# Patient Record
Sex: MALE | Race: WHITE | Employment: OTHER | URBAN - METROPOLITAN AREA
[De-identification: names, ages, dates, MRNs, and addresses within clinical notes are randomized per-mention and may not be internally consistent; named-entity substitution may affect disease eponyms.]

---

## 2022-10-19 ENCOUNTER — OFFICE VISIT (OUTPATIENT)
Dept: INTERNAL MEDICINE | Facility: CLINIC | Age: 77
End: 2022-10-19
Payer: MEDICARE

## 2022-10-19 VITALS
OXYGEN SATURATION: 97 % | WEIGHT: 197.75 LBS | TEMPERATURE: 98 F | BODY MASS INDEX: 27.69 KG/M2 | HEART RATE: 72 BPM | HEIGHT: 71 IN | SYSTOLIC BLOOD PRESSURE: 130 MMHG | DIASTOLIC BLOOD PRESSURE: 64 MMHG

## 2022-10-19 DIAGNOSIS — M17.12 PRIMARY OSTEOARTHRITIS OF LEFT KNEE: Primary | ICD-10-CM

## 2022-10-19 DIAGNOSIS — Z01.810 PREOP CARDIOVASCULAR EXAM: ICD-10-CM

## 2022-10-19 DIAGNOSIS — I10 HYPERTENSION, UNSPECIFIED TYPE: ICD-10-CM

## 2022-10-19 PROBLEM — C61 PROSTATE CANCER: Status: ACTIVE | Noted: 2017-11-16

## 2022-10-19 PROCEDURE — 1160F RVW MEDS BY RX/DR IN RCRD: CPT | Mod: CPTII,S$GLB,, | Performed by: FAMILY MEDICINE

## 2022-10-19 PROCEDURE — 1101F PR PT FALLS ASSESS DOC 0-1 FALLS W/OUT INJ PAST YR: ICD-10-PCS | Mod: CPTII,S$GLB,, | Performed by: FAMILY MEDICINE

## 2022-10-19 PROCEDURE — 1159F PR MEDICATION LIST DOCUMENTED IN MEDICAL RECORD: ICD-10-PCS | Mod: CPTII,S$GLB,, | Performed by: FAMILY MEDICINE

## 2022-10-19 PROCEDURE — 1160F PR REVIEW ALL MEDS BY PRESCRIBER/CLIN PHARMACIST DOCUMENTED: ICD-10-PCS | Mod: CPTII,S$GLB,, | Performed by: FAMILY MEDICINE

## 2022-10-19 PROCEDURE — 3075F PR MOST RECENT SYSTOLIC BLOOD PRESS GE 130-139MM HG: ICD-10-PCS | Mod: CPTII,S$GLB,, | Performed by: FAMILY MEDICINE

## 2022-10-19 PROCEDURE — 99204 PR OFFICE/OUTPT VISIT, NEW, LEVL IV, 45-59 MIN: ICD-10-PCS | Mod: S$GLB,,, | Performed by: FAMILY MEDICINE

## 2022-10-19 PROCEDURE — 1159F MED LIST DOCD IN RCRD: CPT | Mod: CPTII,S$GLB,, | Performed by: FAMILY MEDICINE

## 2022-10-19 PROCEDURE — 1101F PT FALLS ASSESS-DOCD LE1/YR: CPT | Mod: CPTII,S$GLB,, | Performed by: FAMILY MEDICINE

## 2022-10-19 PROCEDURE — 1126F PR PAIN SEVERITY QUANTIFIED, NO PAIN PRESENT: ICD-10-PCS | Mod: CPTII,S$GLB,, | Performed by: FAMILY MEDICINE

## 2022-10-19 PROCEDURE — 3078F PR MOST RECENT DIASTOLIC BLOOD PRESSURE < 80 MM HG: ICD-10-PCS | Mod: CPTII,S$GLB,, | Performed by: FAMILY MEDICINE

## 2022-10-19 PROCEDURE — 99999 PR PBB SHADOW E&M-NEW PATIENT-LVL III: CPT | Mod: PBBFAC,,, | Performed by: FAMILY MEDICINE

## 2022-10-19 PROCEDURE — 1126F AMNT PAIN NOTED NONE PRSNT: CPT | Mod: CPTII,S$GLB,, | Performed by: FAMILY MEDICINE

## 2022-10-19 PROCEDURE — 3075F SYST BP GE 130 - 139MM HG: CPT | Mod: CPTII,S$GLB,, | Performed by: FAMILY MEDICINE

## 2022-10-19 PROCEDURE — 3288F FALL RISK ASSESSMENT DOCD: CPT | Mod: CPTII,S$GLB,, | Performed by: FAMILY MEDICINE

## 2022-10-19 PROCEDURE — 99204 OFFICE O/P NEW MOD 45 MIN: CPT | Mod: S$GLB,,, | Performed by: FAMILY MEDICINE

## 2022-10-19 PROCEDURE — 3288F PR FALLS RISK ASSESSMENT DOCUMENTED: ICD-10-PCS | Mod: CPTII,S$GLB,, | Performed by: FAMILY MEDICINE

## 2022-10-19 PROCEDURE — 3078F DIAST BP <80 MM HG: CPT | Mod: CPTII,S$GLB,, | Performed by: FAMILY MEDICINE

## 2022-10-19 PROCEDURE — 99999 PR PBB SHADOW E&M-NEW PATIENT-LVL III: ICD-10-PCS | Mod: PBBFAC,,, | Performed by: FAMILY MEDICINE

## 2022-10-19 RX ORDER — MULTIVITAMIN
1 TABLET ORAL DAILY
COMMUNITY

## 2022-10-19 RX ORDER — OMEPRAZOLE 40 MG/1
1 CAPSULE, DELAYED RELEASE ORAL DAILY
COMMUNITY
Start: 2022-07-26 | End: 2023-08-08

## 2022-10-19 RX ORDER — LOSARTAN POTASSIUM AND HYDROCHLOROTHIAZIDE 12.5; 1 MG/1; MG/1
1 TABLET ORAL DAILY
COMMUNITY
Start: 2022-07-26 | End: 2023-05-11 | Stop reason: SDUPTHER

## 2022-10-19 NOTE — PROGRESS NOTES
"Subjective:      Patient ID: Bhargav Ferguson is a 77 y.o. male.    Chief Complaint: Pre-op Exam and Establish Care    HPI 77 y.o.   male patient with a PMHx of arthritis presents to clinic to establish care. This is the patient's first encounter with Dr. PERKINS.    Today he reports ***. Patient otherwise without complaints. Denies SOB, chest pain, and bowel changes.        Past Medical History:   Diagnosis Date    Arthritis      Family History   Problem Relation Age of Onset    Cancer Mother     Diabetes Father      Past Surgical History:   Procedure Laterality Date    APPENDECTOMY      HEEL SPUR SURGERY Right      Social History     Tobacco Use    Smoking status: Never    Smokeless tobacco: Never       BP (!) 150/80   Pulse 72   Temp 98 °F (36.7 °C)   Ht 5' 11" (1.803 m)   Wt 89.7 kg (197 lb 12 oz)   SpO2 97%   BMI 27.58 kg/m²     Review of Systems   Constitutional:  Negative for activity change, appetite change, chills, diaphoresis, fatigue, fever and unexpected weight change.   HENT:  Negative for hearing loss and rhinorrhea.    Eyes:  Negative for discharge and visual disturbance.   Respiratory:  Negative for cough, chest tightness, shortness of breath and wheezing.    Cardiovascular:  Negative for chest pain, palpitations and leg swelling.   Gastrointestinal:  Negative for abdominal distention, abdominal pain, blood in stool, constipation, diarrhea and vomiting.   Endocrine: Negative for polydipsia and polyuria.   Genitourinary:  Negative for difficulty urinating, dysuria, frequency, hematuria and urgency.   Musculoskeletal:  Negative for arthralgias, back pain, joint swelling and neck pain.   Skin:  Negative for color change and rash.   Neurological:  Negative for weakness and headaches.   Hematological:  Negative for adenopathy.   Psychiatric/Behavioral:  Negative for confusion, decreased concentration and dysphoric mood.      Objective:     Physical Exam  Vitals and nursing note reviewed.   Constitutional:  "      General: He is not in acute distress.  HENT:      Right Ear: External ear normal.      Left Ear: External ear normal.      Nose: Nose normal.   Eyes:      Conjunctiva/sclera: Conjunctivae normal.      Pupils: Pupils are equal, round, and reactive to light.   Neck:      Vascular: No carotid bruit.   Cardiovascular:      Rate and Rhythm: Normal rate and regular rhythm.      Heart sounds: Normal heart sounds.   Pulmonary:      Effort: Pulmonary effort is normal. No respiratory distress.      Breath sounds: Normal breath sounds. No wheezing or rales.   Abdominal:      General: Bowel sounds are normal. There is no distension.      Palpations: Abdomen is soft.      Tenderness: There is no abdominal tenderness. There is no guarding.   Musculoskeletal:      Cervical back: Normal range of motion and neck supple.      Right lower leg: No edema.      Left lower leg: No edema.   Skin:     General: Skin is warm and dry.      Findings: No rash.   Neurological:      Mental Status: He is alert and oriented to person, place, and time.   Psychiatric:         Behavior: Behavior normal.         Thought Content: Thought content normal.         Judgment: Judgment normal.       No results found for: WBC, HGB, HCT, PLT, CHOL, TRIG, HDL, LDLDIRECT, ALT, AST, NA, K, CL, CREATININE, BUN, CO2, TSH, PSA, INR, GLUF, HGBA1C, MICROALBUR    Assessment:     No diagnosis found.     Plan:     There are no diagnoses linked to this encounter.    Vitals reviewed. BP elevated at 150/80    As an establishing patient, he will need baseline labs. Reviewed medical, social and family history. Reviewed medication list.       Questions and concerns addressed.          Documentation entered by Denny Hamilton, acting as scribe for Dr. Juan Mckee. 10/19/2022 9:20 AM.

## 2022-10-19 NOTE — PROGRESS NOTES
"Outpatient Psychiatry Follow up(MD/NP)    4/13/2017    Tiffany Hendrix, a 51 y.o. female, presenting for initial evaluation visit. Met with patient.    Reason for Encounter: follow up*. Patient complains of   Chief Complaint   Patient presents with    Mood Disorder    Anxiety   .    History of Present Illness:     Patient was seen and examined. Her chart was reviewed.     She has been compliant with treatment. She denied ay side effects.     Her knee replacement surgery is to be scheduled for 6/2017. The bone marrow work up was negative.     Family and marriage are stable. She is not working at this time, secondary to chronic back pain issues.     She feels well and requests a decrease in wellbutrin to q day, "I have been feeling good and would like to not be on so much meds."     Denied Symptoms of Depression: no diminished mood or loss of interest/anhedonia; no irritability, diminished energy, change in sleep, change in appetite, diminished concentration or cognition or indecisiveness, PMA/R, excessive guilt or hopelessness or worthlessness, or suicidal ideations; no anxious features; no episodes that lasted more than 2 weeks or more at a time- episodes last a few days at a time    Denied issues with Sleep: no issues with initiation, maintenance, early morning awakening with inability to return to sleep, or hypersomnolence     Denied Suicidal/Homicidal ideations: no active/passive ideations, organized plans, or future intentions    Denied Symptoms of psychosis: no hallucinations, delusions, disorganized thinking, disorganized behavior or abnormal motor behavior, or negative symptoms     Denied past or current Symptoms of aron or hypomania: no elevated, expansive, or irritable mood with increased energy or activity; with no inflated self-esteem or grandiosity, decreased need for sleep, increased rate of speech, FOI or racing thoughts, distractibility, increased goal directed activity or PMA, or " "Subjective:      Patient ID: Bhargav eFrguson is a 77 y.o. male who presents to the office today for a preoperative consultation at the request of surgeon Dr. Phipps who plans on performing total knee replacement (left) on November 1. This consultation is requested for the specific conditions prompting preoperative evaluation (i.e. because of potential affect on operative risk). Planned anesthesia: local. The patient has the following known anesthesia issues: none. Patients bleeding risk: none. Patient does not have objections to receiving blood products if needed.    The following portions of the patient's history were reviewed and updated as appropriate:    Chief Complaint: Pre-op Exam and Establish Care    HPI   Patient is unknown to Dr. Prakash. This is the patient's first encounter with Dr. PERKINS. Patient reports he seen his cardiologist, Dr. Rodney on 9/30/22. He states Dr. Rodney did perform a pre-op exam and ordered an EKG. Patient reports Dr. Phipps has ordered all required labs. Patient otherwise without complaints. Denies SOB, chest pain, and bowel changes.      Past Medical History:   Diagnosis Date    Arthritis     Elevated PSA     questionable hx of prostate CA    HTN (hypertension)      Family History   Problem Relation Age of Onset    Cancer Mother     Diabetes Father      Past Surgical History:   Procedure Laterality Date    APPENDECTOMY      HEEL SPUR SURGERY Right      Social History     Tobacco Use    Smoking status: Never    Smokeless tobacco: Never   Substance Use Topics    Drug use: Never       /64   Pulse 72   Temp 98 °F (36.7 °C)   Ht 5' 11" (1.803 m)   Wt 89.7 kg (197 lb 12 oz)   SpO2 97%   BMI 27.58 kg/m²     Review of Systems   Constitutional:  Positive for activity change. Negative for appetite change, chills, diaphoresis, fatigue, fever and unexpected weight change.   HENT:  Negative for hearing loss and rhinorrhea.    Eyes:  Negative for discharge and visual disturbance. "   Respiratory:  Negative for cough, chest tightness, shortness of breath and wheezing.    Cardiovascular:  Negative for chest pain, palpitations and leg swelling.   Gastrointestinal:  Negative for abdominal distention, abdominal pain, blood in stool, constipation, diarrhea and vomiting.   Endocrine: Negative for polydipsia and polyuria.   Genitourinary:  Negative for difficulty urinating, dysuria, frequency, hematuria and urgency.   Musculoskeletal:  Positive for arthralgias. Negative for back pain, joint swelling and neck pain.   Skin:  Negative for color change and rash.   Neurological:  Negative for weakness and headaches.   Hematological:  Negative for adenopathy.   Psychiatric/Behavioral:  Negative for confusion, decreased concentration and dysphoric mood.      Objective:     Physical Exam  Vitals and nursing note reviewed.   Constitutional:       General: He is not in acute distress.  HENT:      Right Ear: External ear normal.      Left Ear: External ear normal.      Nose: Nose normal.   Eyes:      Conjunctiva/sclera: Conjunctivae normal.      Pupils: Pupils are equal, round, and reactive to light.   Neck:      Vascular: No carotid bruit.   Cardiovascular:      Rate and Rhythm: Normal rate and regular rhythm.      Heart sounds: Normal heart sounds.   Pulmonary:      Effort: Pulmonary effort is normal. No respiratory distress.      Breath sounds: Normal breath sounds. No wheezing or rales.   Abdominal:      General: Bowel sounds are normal. There is no distension.      Palpations: Abdomen is soft.      Tenderness: There is no abdominal tenderness. There is no guarding.   Musculoskeletal:      Cervical back: Normal range of motion and neck supple.      Right lower leg: No edema.      Left lower leg: No edema.   Skin:     General: Skin is warm and dry.      Findings: No rash.   Neurological:      Mental Status: He is alert and oriented to person, place, and time.   Psychiatric:         Behavior: Behavior normal.   "risky/disinhibited behavior    She would like to continue simplifying/decreasing her medications    A comprehensive ROS was negative aside from chronic knee pain    Spoke with her  who reports that she is doing well and corroborates the above and following history and plan.   .  Current Evaluation:     Nutritional Screening: Considering the patient's height and weight, medications, medical history and preferences, should a referral be made to the dietitian? no    Constitutional  Vitals:  Most recent vital signs, dated less than 90 days prior to this appointment, were reviewed.    Vitals:    04/13/17 1236   BP: 119/72   Pulse: 73   Resp: 16   Weight: 113.3 kg (249 lb 14.3 oz)   Height: 5' 7" (1.702 m)     Body mass index is 39.14 kg/(m^2).       General:  unremarkable, age appropriate     Musculoskeletal  Muscle Strength/Tone:  not examined, no dyskinesia, no dystonia, no tremor, no tic   Gait & Station:  non-ataxic     Psychiatric  Speech:  no latency; no press   Mood & Affect:  euthymic "good"  congruent and appropriate   Thought Process:  normal and logical   Associations:  intact   Thought Content:  normal, no suicidality, no homicidality, delusions, or paranoia   Insight:  intact   Judgement: behavior is adequate to circumstances   Orientation:  grossly intact   Memory: intact for content of interview   Language: grossly intact, able to name, able to repeat   Attention Span & Concentration:  able to focus   Fund of Knowledge:  intact and appropriate to age and level of education       Relevant Elements of Neurological Exam: normal gait    Laboratory Data  No visits with results within 1 Month(s) from this visit.  Latest known visit with results is:    Lab Visit on 02/26/2013   Component Date Value Ref Range Status    TSH 02/26/2013 0.071* 0.4 - 4.0 uIU/ml Final    Free T4 02/26/2013 0.74  0.71 - 1.51 ng/dl Final    T3, Free 02/26/2013 4.6* 2.3 - 4.2 pg/mL Final    T3, Total 02/26/2013 217.83* 60 - 180 "        Thought Content: Thought content normal.         Judgment: Judgment normal.       No results found for: WBC, HGB, HCT, PLT, CHOL, TRIG, HDL, LDLDIRECT, ALT, AST, NA, K, CL, CREATININE, BUN, CO2, TSH, PSA, INR, GLUF, HGBA1C, MICROALBUR    Assessment:     1. Primary osteoarthritis of left knee    2. Preop cardiovascular exam    3. Hypertension, unspecified type         Plan:     Primary osteoarthritis of left knee    Preop cardiovascular exam    Hypertension, unspecified type    Vital reviewed. BP elevated at 150/80. Recheck 130/62    Outside labs reviewed and discussed.    I reviewed the patient's past medical, surgical, social and family history and with  physical exam findings and the proposed surgery and I make the following recommendations:     From a cardiac standpoint the patient is low risk for surgery with a low risk surgery.   Pt was evaluated by his cardiology 9/30/22.    From a pulmonary standpoint the patient presents as a good candidate as well. The patient has no history of lung disease or pulmonary symptoms. Good pulmonary toilet, incentive spirometry, early ambulation are all recommended to improve the pulmonary outcome. No further pulmonary workup as noted prior to surgery.     DVT prophylaxis should be per standard. Venous compression devices are recommended. Early ambulation. Patient has been educated on signs and symptoms of both DVT and pulmonary embolus and instructed on what to do if there are symptoms postop.     The patient has been instructed to take blood pressure medication the morning of surgery with a sip of water. Avoid any vitamins and supplements between now and surgery.  Hold all ASA/NSAID products 7 days prior to surgery.    Reviewed preop labs/MRSA/UA//all are within acceptable range.  Pt overall, low risk candidate to proceed with surgery.  Will fax clearance note to Dr. Dereck Phipps     Juan Ben Prakash MD        Documentation entered by Denny Hamilton,  ng/dl Final         Medications  Outpatient Encounter Prescriptions as of 4/13/2017   Medication Sig Dispense Refill    aspirin (ECOTRIN) 81 MG EC tablet Take 81 mg by mouth once daily.      buPROPion (WELLBUTRIN SR) 150 MG TBSR 12 hr tablet Take 1 tablet (150 mg total) by mouth 2 (two) times daily. 180 tablet 4    calcium carbonate-vitamin D3 (CALCIUM 600 + D,3,) 600 mg calcium- 200 unit Cap Take by mouth.      duloxetine (CYMBALTA) 60 MG capsule Take 1 capsule (60 mg total) by mouth once daily. With 30 mg capsule for a total of 90 mg per day (Patient taking differently: Take 60 mg by mouth once daily. TAKE ONE CAPSULE BY MOUTH DAILY.) 90 capsule 0    ESTRADIOL (VIVELLE-DOT TD) Place onto the skin.      lamotrigine (LAMICTAL) 100 MG tablet Take 1 tablet (100 mg total) by mouth once daily. 90 tablet 0    levothyroxine (SYNTHROID) 112 MCG tablet TAKE 1 TABLET ONCE A DAY ORALLY 90 DAYS  1    metoprolol succinate (TOPROL-XL) 25 MG 24 hr tablet Take 25 mg by mouth once daily.      MULTIVIT &MINERALS/FERROUS FUM (MULTI VITAMIN ORAL) Take by mouth.      pantoprazole (PROTONIX) 40 MG tablet Take 40 mg by mouth once daily.      rosuvastatin (CRESTOR) 10 MG tablet Take 10 mg by mouth every evening.      [DISCONTINUED] diazePAM (VALIUM) 2 MG tablet Take 1-2 tabs (2-4 mg) as needed for anxiety for MRI procedure 4 tablet 0     No facility-administered encounter medications on file as of 4/13/2017.            Assessment - Diagnosis - Goals:     Unspecified Depressive Disorder  Unspecified Anxiety Disorder    Treatment Plan/Recommendations:       Medications:  Trial of decreased Bupropion  mg po q DAILY for depression  Cymbalta to 60 mg po q AM for depression/chronic pain  Lamictal 100 mg po q day for adjunctive depression/chronic pain    Discussed diagnosis, risks and benefits of proposed treatment vs alternative treatments vs no treatment, and potential side effects of these treatments.  The patient expresses  acting as scribe for Dr. Juan Mckee. 10/19/2022 9:35 AM.   understanding of the above and displays the capacity to agree with this treatment given said understanding.  Patient also agrees that, currently, the benefits outweigh the risks and would like to pursue treatment at this time.    We discussed risk of relapse with lower dosages- she was counseled on s/s of relapse. She and her  will be on guard fo rthem and resume previous dosages and call the clinic if symptoms return.     Therapy:   Not indicated at this time; will refer if/when needed    Return to Clinic: 3 months, sooner if needed    Jey Og MD  Psychiatry

## 2022-10-20 ENCOUNTER — TELEPHONE (OUTPATIENT)
Dept: CARDIOLOGY | Facility: CLINIC | Age: 77
End: 2022-10-20
Payer: MEDICARE

## 2022-10-20 DIAGNOSIS — I34.0 MITRAL VALVE INSUFFICIENCY, UNSPECIFIED ETIOLOGY: Primary | ICD-10-CM

## 2023-03-08 ENCOUNTER — OFFICE VISIT (OUTPATIENT)
Dept: INTERNAL MEDICINE | Facility: CLINIC | Age: 78
End: 2023-03-08
Payer: MEDICARE

## 2023-03-08 ENCOUNTER — LAB VISIT (OUTPATIENT)
Dept: LAB | Facility: HOSPITAL | Age: 78
End: 2023-03-08
Attending: FAMILY MEDICINE
Payer: MEDICARE

## 2023-03-08 VITALS
HEART RATE: 67 BPM | BODY MASS INDEX: 29.03 KG/M2 | WEIGHT: 202.81 LBS | DIASTOLIC BLOOD PRESSURE: 78 MMHG | HEIGHT: 70 IN | SYSTOLIC BLOOD PRESSURE: 138 MMHG | TEMPERATURE: 97 F

## 2023-03-08 DIAGNOSIS — Z12.5 PROSTATE CANCER SCREENING: ICD-10-CM

## 2023-03-08 DIAGNOSIS — E78.5 DYSLIPIDEMIA: ICD-10-CM

## 2023-03-08 DIAGNOSIS — Z00.00 ANNUAL PHYSICAL EXAM: Primary | ICD-10-CM

## 2023-03-08 DIAGNOSIS — I10 HYPERTENSION, UNSPECIFIED TYPE: ICD-10-CM

## 2023-03-08 DIAGNOSIS — R73.09 ABNORMAL GLUCOSE: ICD-10-CM

## 2023-03-08 LAB
ALBUMIN SERPL BCP-MCNC: 4 G/DL (ref 3.5–5.2)
ALP SERPL-CCNC: 74 U/L (ref 55–135)
ALT SERPL W/O P-5'-P-CCNC: 23 U/L (ref 10–44)
ANION GAP SERPL CALC-SCNC: 8 MMOL/L (ref 8–16)
AST SERPL-CCNC: 23 U/L (ref 10–40)
BASOPHILS # BLD AUTO: 0.05 K/UL (ref 0–0.2)
BASOPHILS NFR BLD: 0.7 % (ref 0–1.9)
BILIRUB SERPL-MCNC: 0.7 MG/DL (ref 0.1–1)
BUN SERPL-MCNC: 27 MG/DL (ref 8–23)
CALCIUM SERPL-MCNC: 10 MG/DL (ref 8.7–10.5)
CHLORIDE SERPL-SCNC: 108 MMOL/L (ref 95–110)
CHOLEST SERPL-MCNC: 187 MG/DL (ref 120–199)
CHOLEST/HDLC SERPL: 3.9 {RATIO} (ref 2–5)
CO2 SERPL-SCNC: 27 MMOL/L (ref 23–29)
COMPLEXED PSA SERPL-MCNC: 7.7 NG/ML (ref 0–4)
CREAT SERPL-MCNC: 1.2 MG/DL (ref 0.5–1.4)
DIFFERENTIAL METHOD: ABNORMAL
EOSINOPHIL # BLD AUTO: 0.1 K/UL (ref 0–0.5)
EOSINOPHIL NFR BLD: 1.9 % (ref 0–8)
ERYTHROCYTE [DISTWIDTH] IN BLOOD BY AUTOMATED COUNT: 14.3 % (ref 11.5–14.5)
EST. GFR  (NO RACE VARIABLE): >60 ML/MIN/1.73 M^2
ESTIMATED AVG GLUCOSE: 117 MG/DL (ref 68–131)
GLUCOSE SERPL-MCNC: 94 MG/DL (ref 70–110)
HBA1C MFR BLD: 5.7 % (ref 4–5.6)
HCT VFR BLD AUTO: 42.5 % (ref 40–54)
HDLC SERPL-MCNC: 48 MG/DL (ref 40–75)
HDLC SERPL: 25.7 % (ref 20–50)
HGB BLD-MCNC: 13 G/DL (ref 14–18)
IMM GRANULOCYTES # BLD AUTO: 0.02 K/UL (ref 0–0.04)
IMM GRANULOCYTES NFR BLD AUTO: 0.3 % (ref 0–0.5)
LDLC SERPL CALC-MCNC: 117.6 MG/DL (ref 63–159)
LYMPHOCYTES # BLD AUTO: 2.7 K/UL (ref 1–4.8)
LYMPHOCYTES NFR BLD: 39.9 % (ref 18–48)
MCH RBC QN AUTO: 28.8 PG (ref 27–31)
MCHC RBC AUTO-ENTMCNC: 30.6 G/DL (ref 32–36)
MCV RBC AUTO: 94 FL (ref 82–98)
MONOCYTES # BLD AUTO: 0.6 K/UL (ref 0.3–1)
MONOCYTES NFR BLD: 8.2 % (ref 4–15)
NEUTROPHILS # BLD AUTO: 3.3 K/UL (ref 1.8–7.7)
NEUTROPHILS NFR BLD: 49 % (ref 38–73)
NONHDLC SERPL-MCNC: 139 MG/DL
NRBC BLD-RTO: 0 /100 WBC
PLATELET # BLD AUTO: 244 K/UL (ref 150–450)
PMV BLD AUTO: 8.5 FL (ref 9.2–12.9)
POTASSIUM SERPL-SCNC: 4.7 MMOL/L (ref 3.5–5.1)
PROT SERPL-MCNC: 7.2 G/DL (ref 6–8.4)
RBC # BLD AUTO: 4.51 M/UL (ref 4.6–6.2)
SODIUM SERPL-SCNC: 143 MMOL/L (ref 136–145)
TRIGL SERPL-MCNC: 107 MG/DL (ref 30–150)
TSH SERPL DL<=0.005 MIU/L-ACNC: 2.08 UIU/ML (ref 0.4–4)
WBC # BLD AUTO: 6.7 K/UL (ref 3.9–12.7)

## 2023-03-08 PROCEDURE — G0009 ADMIN PNEUMOCOCCAL VACCINE: HCPCS | Mod: HCNC,S$GLB,, | Performed by: FAMILY MEDICINE

## 2023-03-08 PROCEDURE — 84153 ASSAY OF PSA TOTAL: CPT | Mod: HCNC | Performed by: FAMILY MEDICINE

## 2023-03-08 PROCEDURE — 3288F PR FALLS RISK ASSESSMENT DOCUMENTED: ICD-10-PCS | Mod: HCNC,CPTII,S$GLB, | Performed by: FAMILY MEDICINE

## 2023-03-08 PROCEDURE — 80061 LIPID PANEL: CPT | Mod: HCNC | Performed by: FAMILY MEDICINE

## 2023-03-08 PROCEDURE — 1126F PR PAIN SEVERITY QUANTIFIED, NO PAIN PRESENT: ICD-10-PCS | Mod: HCNC,CPTII,S$GLB, | Performed by: FAMILY MEDICINE

## 2023-03-08 PROCEDURE — 1101F PR PT FALLS ASSESS DOC 0-1 FALLS W/OUT INJ PAST YR: ICD-10-PCS | Mod: HCNC,CPTII,S$GLB, | Performed by: FAMILY MEDICINE

## 2023-03-08 PROCEDURE — 99397 PER PM REEVAL EST PAT 65+ YR: CPT | Mod: HCNC,25,S$GLB, | Performed by: FAMILY MEDICINE

## 2023-03-08 PROCEDURE — 90677 PNEUMOCOCCAL CONJUGATE VACCINE 20-VALENT: ICD-10-PCS | Mod: HCNC,S$GLB,, | Performed by: FAMILY MEDICINE

## 2023-03-08 PROCEDURE — 84443 ASSAY THYROID STIM HORMONE: CPT | Mod: HCNC | Performed by: FAMILY MEDICINE

## 2023-03-08 PROCEDURE — 3078F PR MOST RECENT DIASTOLIC BLOOD PRESSURE < 80 MM HG: ICD-10-PCS | Mod: HCNC,CPTII,S$GLB, | Performed by: FAMILY MEDICINE

## 2023-03-08 PROCEDURE — 3078F DIAST BP <80 MM HG: CPT | Mod: HCNC,CPTII,S$GLB, | Performed by: FAMILY MEDICINE

## 2023-03-08 PROCEDURE — 1126F AMNT PAIN NOTED NONE PRSNT: CPT | Mod: HCNC,CPTII,S$GLB, | Performed by: FAMILY MEDICINE

## 2023-03-08 PROCEDURE — 83036 HEMOGLOBIN GLYCOSYLATED A1C: CPT | Mod: HCNC | Performed by: FAMILY MEDICINE

## 2023-03-08 PROCEDURE — 3288F FALL RISK ASSESSMENT DOCD: CPT | Mod: HCNC,CPTII,S$GLB, | Performed by: FAMILY MEDICINE

## 2023-03-08 PROCEDURE — 1159F MED LIST DOCD IN RCRD: CPT | Mod: HCNC,CPTII,S$GLB, | Performed by: FAMILY MEDICINE

## 2023-03-08 PROCEDURE — 3075F SYST BP GE 130 - 139MM HG: CPT | Mod: HCNC,CPTII,S$GLB, | Performed by: FAMILY MEDICINE

## 2023-03-08 PROCEDURE — 99397 PR PREVENTIVE VISIT,EST,65 & OVER: ICD-10-PCS | Mod: HCNC,25,S$GLB, | Performed by: FAMILY MEDICINE

## 2023-03-08 PROCEDURE — 1160F RVW MEDS BY RX/DR IN RCRD: CPT | Mod: HCNC,CPTII,S$GLB, | Performed by: FAMILY MEDICINE

## 2023-03-08 PROCEDURE — 1160F PR REVIEW ALL MEDS BY PRESCRIBER/CLIN PHARMACIST DOCUMENTED: ICD-10-PCS | Mod: HCNC,CPTII,S$GLB, | Performed by: FAMILY MEDICINE

## 2023-03-08 PROCEDURE — 1159F PR MEDICATION LIST DOCUMENTED IN MEDICAL RECORD: ICD-10-PCS | Mod: HCNC,CPTII,S$GLB, | Performed by: FAMILY MEDICINE

## 2023-03-08 PROCEDURE — 3075F PR MOST RECENT SYSTOLIC BLOOD PRESS GE 130-139MM HG: ICD-10-PCS | Mod: HCNC,CPTII,S$GLB, | Performed by: FAMILY MEDICINE

## 2023-03-08 PROCEDURE — 1101F PT FALLS ASSESS-DOCD LE1/YR: CPT | Mod: HCNC,CPTII,S$GLB, | Performed by: FAMILY MEDICINE

## 2023-03-08 PROCEDURE — 99999 PR PBB SHADOW E&M-EST. PATIENT-LVL III: CPT | Mod: PBBFAC,HCNC,, | Performed by: FAMILY MEDICINE

## 2023-03-08 PROCEDURE — G0009 PNEUMOCOCCAL CONJUGATE VACCINE 20-VALENT: ICD-10-PCS | Mod: HCNC,S$GLB,, | Performed by: FAMILY MEDICINE

## 2023-03-08 PROCEDURE — 80053 COMPREHEN METABOLIC PANEL: CPT | Mod: HCNC | Performed by: FAMILY MEDICINE

## 2023-03-08 PROCEDURE — 99999 PR PBB SHADOW E&M-EST. PATIENT-LVL III: ICD-10-PCS | Mod: PBBFAC,HCNC,, | Performed by: FAMILY MEDICINE

## 2023-03-08 PROCEDURE — 85025 COMPLETE CBC W/AUTO DIFF WBC: CPT | Mod: HCNC | Performed by: FAMILY MEDICINE

## 2023-03-08 PROCEDURE — 90677 PCV20 VACCINE IM: CPT | Mod: HCNC,S$GLB,, | Performed by: FAMILY MEDICINE

## 2023-03-08 PROCEDURE — 36415 COLL VENOUS BLD VENIPUNCTURE: CPT | Mod: HCNC,PO | Performed by: FAMILY MEDICINE

## 2023-03-08 NOTE — PROGRESS NOTES
"Subjective:      Patient ID: Bhargav Ferguson is a 77 y.o. male.    Chief Complaint: Annual Exam    HPI 77 y.o.   male patient with a PMHx of arthritis, elevated PSA, and hypertension presents to clinic for annual exam. The patient was last seen on October 19, 2022      Today he reports he have been experiencing gas. Patient reports he is still dealing with knee pain from the knee replacement surgery. He states he is moving around much better. Patient otherwise without complaints. Denies SOB, chest pain, and bowel changes.       Past Medical History:   Diagnosis Date    Arthritis     Elevated PSA     questionable hx of prostate CA    HTN (hypertension)      Family History   Problem Relation Age of Onset    Cancer Mother     Diabetes Father      Past Surgical History:   Procedure Laterality Date    APPENDECTOMY      HEEL SPUR SURGERY Right      Social History     Tobacco Use    Smoking status: Never    Smokeless tobacco: Never   Substance Use Topics    Drug use: Never       /78   Pulse 67   Temp 97 °F (36.1 °C)   Ht 5' 10" (1.778 m)   Wt 92 kg (202 lb 13.2 oz)   BMI 29.10 kg/m²     Review of Systems   Constitutional:  Positive for activity change. Negative for appetite change, chills, diaphoresis, fatigue, fever and unexpected weight change.   HENT:  Negative for hearing loss and rhinorrhea.    Eyes:  Negative for discharge and visual disturbance.   Respiratory:  Negative for cough, chest tightness, shortness of breath and wheezing.    Cardiovascular:  Negative for chest pain, palpitations and leg swelling.   Gastrointestinal:  Negative for abdominal distention, abdominal pain, blood in stool, constipation, diarrhea and vomiting.   Endocrine: Negative for polydipsia and polyuria.   Genitourinary:  Negative for difficulty urinating, dysuria, frequency, hematuria and urgency.   Musculoskeletal:  Positive for arthralgias. Negative for back pain, joint swelling and neck pain.   Skin:  Negative for color change " and rash.   Neurological:  Negative for weakness and headaches.   Hematological:  Negative for adenopathy.   Psychiatric/Behavioral:  Negative for confusion, decreased concentration and dysphoric mood.      Objective:     Physical Exam  Vitals and nursing note reviewed.   Constitutional:       General: He is not in acute distress.  HENT:      Right Ear: External ear normal.      Left Ear: External ear normal.      Nose: Nose normal.   Eyes:      Conjunctiva/sclera: Conjunctivae normal.      Pupils: Pupils are equal, round, and reactive to light.   Cardiovascular:      Rate and Rhythm: Normal rate and regular rhythm.      Heart sounds: Normal heart sounds.   Pulmonary:      Effort: Pulmonary effort is normal. No respiratory distress.      Breath sounds: Normal breath sounds. No wheezing or rales.   Abdominal:      General: Bowel sounds are normal. There is no distension.      Palpations: Abdomen is soft.      Tenderness: There is no abdominal tenderness. There is no guarding.   Musculoskeletal:      Cervical back: Normal range of motion and neck supple.      Right lower leg: No edema.      Left lower leg: No edema.   Skin:     General: Skin is warm and dry.      Findings: No rash.   Neurological:      Mental Status: He is alert and oriented to person, place, and time.   Psychiatric:         Behavior: Behavior normal.         Thought Content: Thought content normal.         Judgment: Judgment normal.       No results found for: WBC, HGB, HCT, PLT, CHOL, TRIG, HDL, LDLDIRECT, ALT, AST, NA, K, CL, CREATININE, BUN, CO2, TSH, PSA, INR, GLUF, HGBA1C, MICROALBUR    Assessment:     1. Annual physical exam    2. Hypertension, unspecified type    3. Abnormal glucose    4. Dyslipidemia    5. Prostate cancer screening         Plan:     Annual physical exam    Hypertension, unspecified type  -     CBC Auto Differential; Future; Expected date: 03/08/2023  -     Comprehensive Metabolic Panel; Future; Expected date:  03/08/2023    Abnormal glucose  -     Hemoglobin A1C; Future; Expected date: 03/08/2023    Dyslipidemia  -     Lipid Panel; Future; Expected date: 03/08/2023  -     TSH; Future; Expected date: 03/08/2023    Prostate cancer screening  -     PSA, Screening; Future; Expected date: 03/08/2023        Vitals reviewed. BP elevated at 140/80; Recheck 138/70  Cont meds  Labs will be updated today.  Pneumo 20 today  Patient overall doing well.  Questions and concerns addressed.          Documentation entered by Denny Hamilton, acting as scribe for Dr. Juan Mckee. 03/08/2023 8:09 AM.

## 2023-03-08 NOTE — PROGRESS NOTES
Pt tolerated well. Advised pt to remain in lobby 15 minutes after injection. If no adverse/allergic reaction, may leave

## 2023-03-09 ENCOUNTER — TELEPHONE (OUTPATIENT)
Dept: INTERNAL MEDICINE | Facility: CLINIC | Age: 78
End: 2023-03-09
Payer: MEDICARE

## 2023-03-09 DIAGNOSIS — R97.20 ELEVATED PSA: Primary | ICD-10-CM

## 2023-03-09 NOTE — TELEPHONE ENCOUNTER
Screening labs overall look ok.  PSA for prostate is high.  Need to have pt see Urology//consult in

## 2023-03-22 ENCOUNTER — OFFICE VISIT (OUTPATIENT)
Dept: UROLOGY | Facility: CLINIC | Age: 78
End: 2023-03-22
Payer: MEDICARE

## 2023-03-22 VITALS
SYSTOLIC BLOOD PRESSURE: 133 MMHG | WEIGHT: 199.06 LBS | HEART RATE: 74 BPM | HEIGHT: 70 IN | DIASTOLIC BLOOD PRESSURE: 72 MMHG | BODY MASS INDEX: 28.5 KG/M2

## 2023-03-22 DIAGNOSIS — R31.29 MICROHEMATURIA: ICD-10-CM

## 2023-03-22 DIAGNOSIS — C61 PROSTATE CANCER: Primary | ICD-10-CM

## 2023-03-22 DIAGNOSIS — R97.20 ELEVATED PSA: ICD-10-CM

## 2023-03-22 LAB
BILIRUB SERPL-MCNC: NORMAL MG/DL
BLOOD URINE, POC: NORMAL
CLARITY, POC UA: CLEAR
COLOR, POC UA: YELLOW
GLUCOSE UR QL STRIP: NORMAL
KETONES UR QL STRIP: NORMAL
LEUKOCYTE ESTERASE URINE, POC: NORMAL
NITRITE, POC UA: NORMAL
PH, POC UA: 5
PROTEIN, POC: NORMAL
SPECIFIC GRAVITY, POC UA: 1.02
UROBILINOGEN, POC UA: NORMAL

## 2023-03-22 PROCEDURE — 99204 PR OFFICE/OUTPT VISIT, NEW, LEVL IV, 45-59 MIN: ICD-10-PCS | Mod: HCNC,S$GLB,, | Performed by: UROLOGY

## 2023-03-22 PROCEDURE — 3288F FALL RISK ASSESSMENT DOCD: CPT | Mod: HCNC,CPTII,S$GLB, | Performed by: UROLOGY

## 2023-03-22 PROCEDURE — 81001 URINALYSIS AUTO W/SCOPE: CPT | Mod: HCNC | Performed by: UROLOGY

## 2023-03-22 PROCEDURE — 99204 OFFICE O/P NEW MOD 45 MIN: CPT | Mod: HCNC,S$GLB,, | Performed by: UROLOGY

## 2023-03-22 PROCEDURE — 1159F MED LIST DOCD IN RCRD: CPT | Mod: HCNC,CPTII,S$GLB, | Performed by: UROLOGY

## 2023-03-22 PROCEDURE — 3075F PR MOST RECENT SYSTOLIC BLOOD PRESS GE 130-139MM HG: ICD-10-PCS | Mod: HCNC,CPTII,S$GLB, | Performed by: UROLOGY

## 2023-03-22 PROCEDURE — 1160F PR REVIEW ALL MEDS BY PRESCRIBER/CLIN PHARMACIST DOCUMENTED: ICD-10-PCS | Mod: HCNC,CPTII,S$GLB, | Performed by: UROLOGY

## 2023-03-22 PROCEDURE — 1126F AMNT PAIN NOTED NONE PRSNT: CPT | Mod: HCNC,CPTII,S$GLB, | Performed by: UROLOGY

## 2023-03-22 PROCEDURE — 3078F PR MOST RECENT DIASTOLIC BLOOD PRESSURE < 80 MM HG: ICD-10-PCS | Mod: HCNC,CPTII,S$GLB, | Performed by: UROLOGY

## 2023-03-22 PROCEDURE — 3075F SYST BP GE 130 - 139MM HG: CPT | Mod: HCNC,CPTII,S$GLB, | Performed by: UROLOGY

## 2023-03-22 PROCEDURE — 3288F PR FALLS RISK ASSESSMENT DOCUMENTED: ICD-10-PCS | Mod: HCNC,CPTII,S$GLB, | Performed by: UROLOGY

## 2023-03-22 PROCEDURE — 1101F PT FALLS ASSESS-DOCD LE1/YR: CPT | Mod: HCNC,CPTII,S$GLB, | Performed by: UROLOGY

## 2023-03-22 PROCEDURE — 99999 PR PBB SHADOW E&M-EST. PATIENT-LVL III: CPT | Mod: PBBFAC,HCNC,, | Performed by: UROLOGY

## 2023-03-22 PROCEDURE — 81002 URINALYSIS NONAUTO W/O SCOPE: CPT | Mod: HCNC,S$GLB,, | Performed by: UROLOGY

## 2023-03-22 PROCEDURE — 81002 POCT URINE DIPSTICK WITHOUT MICROSCOPE: ICD-10-PCS | Mod: HCNC,S$GLB,, | Performed by: UROLOGY

## 2023-03-22 PROCEDURE — 99999 PR PBB SHADOW E&M-EST. PATIENT-LVL III: ICD-10-PCS | Mod: PBBFAC,HCNC,, | Performed by: UROLOGY

## 2023-03-22 PROCEDURE — 1159F PR MEDICATION LIST DOCUMENTED IN MEDICAL RECORD: ICD-10-PCS | Mod: HCNC,CPTII,S$GLB, | Performed by: UROLOGY

## 2023-03-22 PROCEDURE — 1160F RVW MEDS BY RX/DR IN RCRD: CPT | Mod: HCNC,CPTII,S$GLB, | Performed by: UROLOGY

## 2023-03-22 PROCEDURE — 1126F PR PAIN SEVERITY QUANTIFIED, NO PAIN PRESENT: ICD-10-PCS | Mod: HCNC,CPTII,S$GLB, | Performed by: UROLOGY

## 2023-03-22 PROCEDURE — 3078F DIAST BP <80 MM HG: CPT | Mod: HCNC,CPTII,S$GLB, | Performed by: UROLOGY

## 2023-03-22 PROCEDURE — 1101F PR PT FALLS ASSESS DOC 0-1 FALLS W/OUT INJ PAST YR: ICD-10-PCS | Mod: HCNC,CPTII,S$GLB, | Performed by: UROLOGY

## 2023-03-22 NOTE — PROGRESS NOTES
Chief Complaint   Patient presents with    Elevated PSA       Referring Provider: Dr. Juan Prakash     History of Present Illness:   Bhargav Ferguson is a 77 y.o. male here for evaluation of Elevated PSA    3/22/23-76yo male, here for evaluation of elevated PSA. Per review of the records, he has seen Dr. Mehta in the past and had a saturation biopsy 5/2016, which showed Jose R 3+3 prostate cancer. Negative metastatic workup at that time. He has been on active surveillance with PSA ranging from 8-17. Last follow up with Dr. Mehta was in 2019. Last biopsy was in 2017 and was negative. He reports having a good urinary stream and no bothersome LUTS. No gross hematuria or hematospermia. No prostate meds.       Review of Systems   Constitutional:  Negative for chills and fever.   Respiratory:  Negative for shortness of breath.    Cardiovascular:  Negative for chest pain.   Gastrointestinal:  Negative for nausea and vomiting.   Musculoskeletal:  Positive for arthralgias and back pain.   All other systems reviewed and are negative.      Past Medical History:   Diagnosis Date    Arthritis     Elevated PSA     questionable hx of prostate CA    HTN (hypertension)        Past Surgical History:   Procedure Laterality Date    APPENDECTOMY      HEEL SPUR SURGERY Right     TOTAL KNEE ARTHROPLASTY Left        Family History   Problem Relation Age of Onset    Cancer Mother         brain    Diabetes Father     Prostate cancer Neg Hx        Social History     Tobacco Use    Smoking status: Never    Smokeless tobacco: Never   Substance Use Topics    Drug use: Never       Current Outpatient Medications   Medication Sig Dispense Refill    losartan-hydrochlorothiazide 100-12.5 mg (HYZAAR) 100-12.5 mg Tab Take 1 tablet by mouth once daily.      multivitamin (THERAGRAN) per tablet Take 1 tablet by mouth once daily.      omeprazole (PRILOSEC) 40 MG capsule Take 1 capsule by mouth once daily.       No current facility-administered  medications for this visit.       Review of patient's allergies indicates:   Allergen Reactions    Ciprofloxacin Diarrhea       Physical Exam  Vitals:    03/22/23 1421   BP: 133/72   Pulse: 74       General: Well-developed, well-nourished in no acute distress  HEENT: Normocephalic, atraumatic, Extraocular movements intact  Neck: supple, trachea midline, no cervical or supraclavicular lymphadenopathy  Respirations: even and unlabored  Back: midline spine, no CVA tenderness  Abdomen: soft, Non-tender, non-distended, no organomegaly or palpable masses, no rebound or guarding  Rectal: 3/22/23-30g prostate, no nodules or tenderness. No gross blood  Extremities: atraumatic, moves all equally, no clubbing, cyanosis or edema  Psych: normal affect  Skin: warm and dry, no lesions  Neuro: Alert and oriented, Cranial nerves II-XII grossly intact    Urinalysis  Trace protein and trace blood      Lab Results   Component Value Date    PSA 7.7 (H) 03/08/2023       Assessment:   1. Prostate cancer  BUN    Creatinine, Serum    MRI Prostate W W/O Contrast    Prostate Specific Antigen, Diagnostic      2. Elevated PSA  Ambulatory referral/consult to Urology      3. Microhematuria  Urinalysis Microscopic          Plan:  Prostate cancer  -     BUN; Future; Expected date: 03/22/2023  -     Creatinine, Serum; Future; Expected date: 03/22/2023  -     MRI Prostate W W/O Contrast; Future; Expected date: 03/22/2023  -     Prostate Specific Antigen, Diagnostic; Future; Expected date: 07/20/2023    Elevated PSA  -     Ambulatory referral/consult to Urology    Microhematuria  -     Urinalysis Microscopic    PSA remains stable. Discussed Active surveillance and watchful waiting and the difference between the two. Pt would like to continue with active surveillance protocol.     Follow up in about 4 months (around 7/22/2023) for labs before visit.

## 2023-03-23 LAB
CAOX CRY UR QL COMP ASSIST: NORMAL
MICROSCOPIC COMMENT: NORMAL
RBC #/AREA URNS AUTO: 4 /HPF (ref 0–4)
SQUAMOUS #/AREA URNS AUTO: 0 /HPF

## 2023-04-04 ENCOUNTER — HOSPITAL ENCOUNTER (OUTPATIENT)
Dept: RADIOLOGY | Facility: HOSPITAL | Age: 78
Discharge: HOME | End: 2023-04-04
Attending: UROLOGY
Payer: MEDICARE

## 2023-04-04 DIAGNOSIS — C61 PROSTATE CANCER: ICD-10-CM

## 2023-04-04 PROCEDURE — 72197 MRI PELVIS W/O & W/DYE: CPT | Mod: TC,HCNC,PN

## 2023-04-04 PROCEDURE — 72197 MRI PROSTATE W W/O CONTRAST: ICD-10-PCS | Mod: 26,HCNC,, | Performed by: RADIOLOGY

## 2023-04-04 PROCEDURE — 25500020 PHARM REV CODE 255: Mod: HCNC,PN | Performed by: UROLOGY

## 2023-04-04 PROCEDURE — A9585 GADOBUTROL INJECTION: HCPCS | Mod: HCNC,PN | Performed by: UROLOGY

## 2023-04-04 PROCEDURE — 72197 MRI PELVIS W/O & W/DYE: CPT | Mod: 26,HCNC,, | Performed by: RADIOLOGY

## 2023-04-04 RX ORDER — GADOBUTROL 604.72 MG/ML
9 INJECTION INTRAVENOUS
Status: COMPLETED | OUTPATIENT
Start: 2023-04-04 | End: 2023-04-04

## 2023-04-04 RX ADMIN — GADOBUTROL 9 ML: 604.72 INJECTION INTRAVENOUS at 10:04

## 2023-05-11 NOTE — TELEPHONE ENCOUNTER
No care due was identified.  [facility] Embedded Care Due Messages. Reference number: [id number].   5/11/2023 4:23:28 PM CDT

## 2023-05-11 NOTE — TELEPHONE ENCOUNTER
----- Message from Monika Klein sent at 5/11/2023  3:56 PM CDT -----  Contact: Cassie/Spouse  .Type:  RX Refill Request    Who Called: Cassie  Refill or New Rx:refill  RX Name and Strength:losartan-hydrochlorothiazide 100-12.5 mg (HYZAAR) 100-12.5 mg Tab  How is the patient currently taking it? (ex. 1XDay):Take 1 tablet by mouth once daily. - Oral  Is this a 30 day or 90 day RX:90  Preferred Pharmacy with phone number:.  [facility] - [place], [place] - [address]  [address] [address]  Phone: [phone] Fax: [phone]  Local or Mail Order:mail  Ordering Provider:Juan Prakash  Would the patient rather a call back or a response via [facility]? yes  Best Call Back Number:[phone]  Additional Information: NA      Thanks  KT

## 2023-05-12 RX ORDER — LOSARTAN POTASSIUM AND HYDROCHLOROTHIAZIDE 12.5; 1 MG/1; MG/1
1 TABLET ORAL DAILY
Qty: 90 TABLET | Refills: 3 | Status: SHIPPED | OUTPATIENT
Start: 2023-05-12 | End: 2023-05-22 | Stop reason: SDUPTHER

## 2023-05-18 ENCOUNTER — PES CALL (OUTPATIENT)
Dept: ADMINISTRATIVE | Facility: CLINIC | Age: 78
End: 2023-05-18
Payer: MEDICARE

## 2023-05-22 RX ORDER — LOSARTAN POTASSIUM AND HYDROCHLOROTHIAZIDE 12.5; 1 MG/1; MG/1
1 TABLET ORAL DAILY
Qty: 30 TABLET | Refills: 0 | Status: SHIPPED | OUTPATIENT
Start: 2023-05-22 | End: 2023-08-17 | Stop reason: SDUPTHER

## 2023-05-22 NOTE — TELEPHONE ENCOUNTER
No care due was identified.  [facility] Embedded Care Due Messages. Reference number: [id number].   5/22/2023 9:04:49 AM CDT

## 2023-05-22 NOTE — TELEPHONE ENCOUNTER
Lv 3/8/23, f/u 3/2024  [facility] is requesting a short supply of med to be sent to the local pharmacy until mail order if received in about 7-10 days.

## 2023-05-22 NOTE — TELEPHONE ENCOUNTER
----- Message from Eloisa Stephen sent at 5/22/2023  8:39 AM CDT -----  Contact: [facility]  [facility] states that the pt is needing a short supply of his losartan-hydrochlorothiazide 100-12.5 mg (HYZAAR) 100-12.5 mg Tab medication sent to his local pharmacy. She states the medication will be delivered to the pt  within 7-10 business days. Pt preferred pharmacy is listed below. You can reach [facility] @ [phone].   [facility] - [facility] [address]  [address] [address]  Phone: [phone] Fax: [phone]

## 2023-07-19 ENCOUNTER — LAB VISIT (OUTPATIENT)
Dept: LAB | Facility: HOSPITAL | Age: 78
End: 2023-07-19
Attending: UROLOGY
Payer: MEDICARE

## 2023-07-19 DIAGNOSIS — C61 PROSTATE CANCER: ICD-10-CM

## 2023-07-19 LAB
BUN SERPL-MCNC: 26 MG/DL (ref 8–23)
COMPLEXED PSA SERPL-MCNC: 9.8 NG/ML (ref 0–4)
CREAT SERPL-MCNC: 1.2 MG/DL (ref 0.5–1.4)
EST. GFR  (NO RACE VARIABLE): >60 ML/MIN/1.73 M^2

## 2023-07-19 PROCEDURE — 36415 COLL VENOUS BLD VENIPUNCTURE: CPT | Mod: HCNC,PN | Performed by: UROLOGY

## 2023-07-19 PROCEDURE — 84520 ASSAY OF UREA NITROGEN: CPT | Mod: HCNC | Performed by: UROLOGY

## 2023-07-19 PROCEDURE — 84153 ASSAY OF PSA TOTAL: CPT | Mod: HCNC | Performed by: UROLOGY

## 2023-07-19 PROCEDURE — 82565 ASSAY OF CREATININE: CPT | Mod: HCNC | Performed by: UROLOGY

## 2023-08-08 ENCOUNTER — OFFICE VISIT (OUTPATIENT)
Dept: UROLOGY | Facility: CLINIC | Age: 78
End: 2023-08-08
Payer: MEDICARE

## 2023-08-08 ENCOUNTER — LAB VISIT (OUTPATIENT)
Dept: LAB | Facility: HOSPITAL | Age: 78
End: 2023-08-08
Attending: UROLOGY
Payer: MEDICARE

## 2023-08-08 VITALS
HEART RATE: 56 BPM | TEMPERATURE: 98 F | HEIGHT: 70 IN | RESPIRATION RATE: 18 BRPM | SYSTOLIC BLOOD PRESSURE: 156 MMHG | BODY MASS INDEX: 28.78 KG/M2 | DIASTOLIC BLOOD PRESSURE: 75 MMHG | WEIGHT: 201.06 LBS

## 2023-08-08 DIAGNOSIS — R31.29 MICROHEMATURIA: ICD-10-CM

## 2023-08-08 DIAGNOSIS — C61 PROSTATE CANCER: Primary | ICD-10-CM

## 2023-08-08 LAB
BILIRUB SERPL-MCNC: NORMAL MG/DL
BLOOD URINE, POC: NORMAL
BUN SERPL-MCNC: 20 MG/DL (ref 8–23)
CLARITY, POC UA: CLEAR
COLOR, POC UA: YELLOW
CREAT SERPL-MCNC: 1.2 MG/DL (ref 0.5–1.4)
EST. GFR  (NO RACE VARIABLE): >60 ML/MIN/1.73 M^2
GLUCOSE UR QL STRIP: NORMAL
KETONES UR QL STRIP: NORMAL
LEUKOCYTE ESTERASE URINE, POC: NORMAL
NITRITE, POC UA: NORMAL
PH, POC UA: 7
PROTEIN, POC: NORMAL
SPECIFIC GRAVITY, POC UA: 1.01
UROBILINOGEN, POC UA: 1

## 2023-08-08 PROCEDURE — 1101F PR PT FALLS ASSESS DOC 0-1 FALLS W/OUT INJ PAST YR: ICD-10-PCS | Mod: HCNC,CPTII,S$GLB, | Performed by: UROLOGY

## 2023-08-08 PROCEDURE — 81002 POCT URINE DIPSTICK WITHOUT MICROSCOPE: ICD-10-PCS | Mod: HCNC,S$GLB,, | Performed by: UROLOGY

## 2023-08-08 PROCEDURE — 1160F RVW MEDS BY RX/DR IN RCRD: CPT | Mod: HCNC,CPTII,S$GLB, | Performed by: UROLOGY

## 2023-08-08 PROCEDURE — 1126F AMNT PAIN NOTED NONE PRSNT: CPT | Mod: HCNC,CPTII,S$GLB, | Performed by: UROLOGY

## 2023-08-08 PROCEDURE — 84520 ASSAY OF UREA NITROGEN: CPT | Mod: HCNC | Performed by: UROLOGY

## 2023-08-08 PROCEDURE — 36415 COLL VENOUS BLD VENIPUNCTURE: CPT | Mod: HCNC,PN | Performed by: UROLOGY

## 2023-08-08 PROCEDURE — 82565 ASSAY OF CREATININE: CPT | Mod: HCNC | Performed by: UROLOGY

## 2023-08-08 PROCEDURE — 99999 PR PBB SHADOW E&M-EST. PATIENT-LVL III: ICD-10-PCS | Mod: PBBFAC,HCNC,, | Performed by: UROLOGY

## 2023-08-08 PROCEDURE — 3077F PR MOST RECENT SYSTOLIC BLOOD PRESSURE >= 140 MM HG: ICD-10-PCS | Mod: HCNC,CPTII,S$GLB, | Performed by: UROLOGY

## 2023-08-08 PROCEDURE — 3078F PR MOST RECENT DIASTOLIC BLOOD PRESSURE < 80 MM HG: ICD-10-PCS | Mod: HCNC,CPTII,S$GLB, | Performed by: UROLOGY

## 2023-08-08 PROCEDURE — 1126F PR PAIN SEVERITY QUANTIFIED, NO PAIN PRESENT: ICD-10-PCS | Mod: HCNC,CPTII,S$GLB, | Performed by: UROLOGY

## 2023-08-08 PROCEDURE — 99999 PR PBB SHADOW E&M-EST. PATIENT-LVL III: CPT | Mod: PBBFAC,HCNC,, | Performed by: UROLOGY

## 2023-08-08 PROCEDURE — 3078F DIAST BP <80 MM HG: CPT | Mod: HCNC,CPTII,S$GLB, | Performed by: UROLOGY

## 2023-08-08 PROCEDURE — 1159F MED LIST DOCD IN RCRD: CPT | Mod: HCNC,CPTII,S$GLB, | Performed by: UROLOGY

## 2023-08-08 PROCEDURE — 1101F PT FALLS ASSESS-DOCD LE1/YR: CPT | Mod: HCNC,CPTII,S$GLB, | Performed by: UROLOGY

## 2023-08-08 PROCEDURE — 99214 OFFICE O/P EST MOD 30 MIN: CPT | Mod: HCNC,S$GLB,, | Performed by: UROLOGY

## 2023-08-08 PROCEDURE — 3288F FALL RISK ASSESSMENT DOCD: CPT | Mod: HCNC,CPTII,S$GLB, | Performed by: UROLOGY

## 2023-08-08 PROCEDURE — 3077F SYST BP >= 140 MM HG: CPT | Mod: HCNC,CPTII,S$GLB, | Performed by: UROLOGY

## 2023-08-08 PROCEDURE — 1159F PR MEDICATION LIST DOCUMENTED IN MEDICAL RECORD: ICD-10-PCS | Mod: HCNC,CPTII,S$GLB, | Performed by: UROLOGY

## 2023-08-08 PROCEDURE — 81002 URINALYSIS NONAUTO W/O SCOPE: CPT | Mod: HCNC,S$GLB,, | Performed by: UROLOGY

## 2023-08-08 PROCEDURE — 99214 PR OFFICE/OUTPT VISIT, EST, LEVL IV, 30-39 MIN: ICD-10-PCS | Mod: HCNC,S$GLB,, | Performed by: UROLOGY

## 2023-08-08 PROCEDURE — 1160F PR REVIEW ALL MEDS BY PRESCRIBER/CLIN PHARMACIST DOCUMENTED: ICD-10-PCS | Mod: HCNC,CPTII,S$GLB, | Performed by: UROLOGY

## 2023-08-08 PROCEDURE — 3288F PR FALLS RISK ASSESSMENT DOCUMENTED: ICD-10-PCS | Mod: HCNC,CPTII,S$GLB, | Performed by: UROLOGY

## 2023-08-08 NOTE — PROGRESS NOTES
Chief Complaint   Patient presents with    Other     4 month f/u       History of Present Illness:   Bhargav Ferguson is a 78 y.o. male here for evaluation of Other (4 month f/u)    8/8/23-Microscopic UA from last visit revealed 4 RBCs/hpf. MRI dated 4/4/23 read as PI RADS 2. Stream is strong. No gross hematuria. Pt reports that he was at [facility] for chest pain, but workup didn't reveal any indication for angioplasty. He does have urgency, which he attributes to his HCTZ. No UUI.   3/22/23-78yo male, here for evaluation of elevated PSA. Per review of the records, he has seen Dr. Mehta in the past and had a saturation biopsy 5/2016, which showed Jose R 3+3 prostate cancer. Negative metastatic workup at that time. He has been on active surveillance with PSA ranging from 8-17. Last follow up with Dr. Mehta was in 2019. Last biopsy was in 2017 and was negative. He reports having a good urinary stream and no bothersome LUTS. No gross hematuria or hematospermia. No prostate meds.       Review of Systems   Constitutional:  Negative for chills and fever.   Respiratory:  Negative for shortness of breath.    Cardiovascular:  Negative for chest pain.   Gastrointestinal:  Negative for nausea and vomiting.   Musculoskeletal:  Positive for arthralgias and back pain.   All other systems reviewed and are negative.        Past Medical History:   Diagnosis Date    Arthritis     Elevated PSA     questionable hx of prostate CA    HTN (hypertension)        Past Surgical History:   Procedure Laterality Date    APPENDECTOMY      HEEL SPUR SURGERY Right     TOTAL KNEE ARTHROPLASTY Left        Family History   Problem Relation Age of Onset    Cancer Mother         brain    Diabetes Father     Prostate cancer Neg Hx        Social History     Tobacco Use    Smoking status: Never    Smokeless tobacco: Never   Substance Use Topics    Drug use: Never       Current Outpatient Medications   Medication Sig Dispense Refill     losartan-hydrochlorothiazide 100-12.5 mg (HYZAAR) 100-12.5 mg Tab Take 1 tablet by mouth once daily. 30 tablet 0    multivitamin (THERAGRAN) per tablet Take 1 tablet by mouth once daily.      omeprazole (PRILOSEC) 40 MG capsule Take 1 capsule by mouth once daily.       No current facility-administered medications for this visit.       Review of patient's allergies indicates:   Allergen Reactions    Ciprofloxacin Diarrhea       Physical Exam  Vitals:    08/08/23 0748   BP: (!) 156/75   Pulse: (!) 56   Resp: 18   Temp: 97.6 °F (36.4 °C)         General: Well-developed, well-nourished in no acute distress  HEENT: Normocephalic, atraumatic, Extraocular movements intact  Neck: supple, trachea midline, no cervical or supraclavicular lymphadenopathy  Respirations: even and unlabored  Back: midline spine, no CVA tenderness  Abdomen: soft, Non-tender, non-distended, no organomegaly or palpable masses, no rebound or guarding  Rectal: 3/22/23-30g prostate, no nodules or tenderness. No gross blood  Extremities: atraumatic, moves all equally, no clubbing, cyanosis or edema  Psych: normal affect  Skin: warm and dry, no lesions  Neuro: Alert and oriented, Cranial nerves II-XII grossly intact      PSA  3/8/23: 7.7  7/19/23: 9.8    UA: trace blood, otherwise negative    Assessment:   1. Prostate cancer  POCT URINE DIPSTICK WITHOUT MICROSCOPE      2. Microhematuria  POCT URINE DIPSTICK WITHOUT MICROSCOPE    BUN    Creatinine, Serum    CT Urogram Abd Pelvis W WO            Plan:  Prostate cancer  -     POCT URINE DIPSTICK WITHOUT MICROSCOPE    Microhematuria  -     POCT URINE DIPSTICK WITHOUT MICROSCOPE  -     BUN; Future; Expected date: 08/08/2023  -     Creatinine, Serum; Future; Expected date: 08/08/2023  -     CT Urogram Abd Pelvis W WO; Future; Expected date: 08/08/2023          Follow up for Cystoscopy.

## 2023-08-11 ENCOUNTER — HOSPITAL ENCOUNTER (OUTPATIENT)
Dept: RADIOLOGY | Facility: HOSPITAL | Age: 78
Discharge: HOME | End: 2023-08-11
Attending: UROLOGY
Payer: MEDICARE

## 2023-08-11 DIAGNOSIS — R31.29 MICROHEMATURIA: ICD-10-CM

## 2023-08-11 PROCEDURE — 25500020 PHARM REV CODE 255: Mod: HCNC,PN | Performed by: UROLOGY

## 2023-08-11 PROCEDURE — 74178 CT ABD&PLV WO CNTR FLWD CNTR: CPT | Mod: 26,HCNC,, | Performed by: RADIOLOGY

## 2023-08-11 PROCEDURE — 74178 CT ABD&PLV WO CNTR FLWD CNTR: CPT | Mod: TC,HCNC,PN

## 2023-08-11 PROCEDURE — 74178 CT UROGRAM ABD PELVIS W WO: ICD-10-PCS | Mod: 26,HCNC,, | Performed by: RADIOLOGY

## 2023-08-11 RX ADMIN — IOHEXOL 100 ML: 350 INJECTION, SOLUTION INTRAVENOUS at 07:08

## 2023-08-17 RX ORDER — ROSUVASTATIN CALCIUM 20 MG/1
20 TABLET, COATED ORAL DAILY
Qty: 90 TABLET | Refills: 3 | Status: SHIPPED | OUTPATIENT
Start: 2023-08-17

## 2023-08-17 RX ORDER — ROSUVASTATIN CALCIUM 20 MG/1
20 TABLET, COATED ORAL DAILY
COMMUNITY
Start: 2023-07-13 | End: 2023-08-17 | Stop reason: SDUPTHER

## 2023-08-17 RX ORDER — LOSARTAN POTASSIUM AND HYDROCHLOROTHIAZIDE 12.5; 1 MG/1; MG/1
1 TABLET ORAL DAILY
Qty: 90 TABLET | Refills: 3 | Status: SHIPPED | OUTPATIENT
Start: 2023-08-17

## 2023-08-17 NOTE — TELEPHONE ENCOUNTER
----- Message from Gildaen Montoya sent at 8/17/2023  9:02 AM CDT -----  Contact: Cassie/Wife  .Type:  RX Refill Request    Who Called: Cassie  Refill or New Rx:refill  RX Name and Strength:losartan-hydrochlorothiazide 100-12.5 mg (HYZAAR) 100-12.5 mg Tab  How is the patient currently taking it? (ex. 1XDay):1XDay  Is this a 30 day or 90 day RX:30  Preferred Pharmacy with phone number:[facility] - [place], [place] - [address]  [address] [address]  Phone: [phone] Fax: [phone]  Local or Mail Order:mail order  Ordering Provider:Juan Stanley  Would the patient rather a call back or a response via [facility]? Call back  Best Call Back Number:[phone]  Additional Information: script to [facility] was cancelled, needs script renewed     .Type:  Needs Medical Advice    Who Called: Cassie  Symptoms (please be specific): cholesterol med refill ; rosuvastatin calcium 20 mg  Pharmacy name and phone #:  [facility] - [place], [place] - [address]  [address] [address]  Phone: [phone] Fax: [phone]  Would the patient rather a call back or a response via [facility]? Call back  Best Call Back Number: [phone]  Additional Information: pt wife states the hospital gave him some medication for cholesterol but no refills, and she was needing to know if that script could be sent as well      Thanks  CARLOS

## 2023-08-17 NOTE — TELEPHONE ENCOUNTER
No care due was identified.  [facility] Embedded Care Due Messages. Reference number: [id number].   8/17/2023 9:14:34 AM CDT

## 2023-08-21 ENCOUNTER — TELEPHONE (OUTPATIENT)
Dept: UROLOGY | Facility: CLINIC | Age: 78
End: 2023-08-21
Payer: MEDICARE

## 2023-08-21 NOTE — TELEPHONE ENCOUNTER
----- Message from Monika Klein sent at 8/21/2023  8:29 AM CDT -----  Contact: Cassie/wife  Pt wife is calling in regards to cancel the appt on 08/29/2023.please call back at [phone]        Thanks  RYAN

## 2023-08-21 NOTE — TELEPHONE ENCOUNTER
Spoke with pt wife and she stated that they just want to cancel, not reschedule, that he doesn't want to do it any more. Advised I will go ahead and cancel the appt.

## 2023-08-22 ENCOUNTER — TELEPHONE (OUTPATIENT)
Dept: INTERNAL MEDICINE | Facility: CLINIC | Age: 78
End: 2023-08-22
Payer: MEDICARE

## 2023-08-22 NOTE — TELEPHONE ENCOUNTER
Pt said, he hasn't been receiving his bp medicine. He had 2 week supply from [facility] pharmacy and is now out. Informed pt that a refill was sent to [facility] mail order pharmacy on 8/17 for a year. He said, he called them and was told they didn't have anything for him. I called [facility] and spoke with Romulo. He said, Losartan-Hctz was shipped on 8/18 and is supposed to arrive today. Pt notified. He verbalized understanding.

## 2023-08-22 NOTE — TELEPHONE ENCOUNTER
----- Message from Doris Fletcher sent at 8/22/2023  8:36 AM CDT -----  Regarding: prescription  Good morning,       Please call pt about some prescriptions that was cancelled.      Thanks!

## 2023-11-20 ENCOUNTER — OFFICE VISIT (OUTPATIENT)
Dept: INTERNAL MEDICINE | Facility: CLINIC | Age: 78
End: 2023-11-20
Payer: MEDICARE

## 2023-11-20 VITALS
WEIGHT: 199.75 LBS | BODY MASS INDEX: 28.6 KG/M2 | SYSTOLIC BLOOD PRESSURE: 104 MMHG | HEIGHT: 70 IN | TEMPERATURE: 97 F | DIASTOLIC BLOOD PRESSURE: 60 MMHG | HEART RATE: 76 BPM

## 2023-11-20 DIAGNOSIS — S60.359A WOOD SPLINTER IN THUMB: ICD-10-CM

## 2023-11-20 DIAGNOSIS — L98.9: Primary | ICD-10-CM

## 2023-11-20 PROCEDURE — 3288F PR FALLS RISK ASSESSMENT DOCUMENTED: ICD-10-PCS | Mod: HCNC,CPTII,S$GLB, | Performed by: NURSE PRACTITIONER

## 2023-11-20 PROCEDURE — 3074F PR MOST RECENT SYSTOLIC BLOOD PRESSURE < 130 MM HG: ICD-10-PCS | Mod: HCNC,CPTII,S$GLB, | Performed by: NURSE PRACTITIONER

## 2023-11-20 PROCEDURE — 99213 PR OFFICE/OUTPT VISIT, EST, LEVL III, 20-29 MIN: ICD-10-PCS | Mod: HCNC,S$GLB,, | Performed by: NURSE PRACTITIONER

## 2023-11-20 PROCEDURE — 3078F DIAST BP <80 MM HG: CPT | Mod: HCNC,CPTII,S$GLB, | Performed by: NURSE PRACTITIONER

## 2023-11-20 PROCEDURE — 1160F PR REVIEW ALL MEDS BY PRESCRIBER/CLIN PHARMACIST DOCUMENTED: ICD-10-PCS | Mod: HCNC,CPTII,S$GLB, | Performed by: NURSE PRACTITIONER

## 2023-11-20 PROCEDURE — 1126F PR PAIN SEVERITY QUANTIFIED, NO PAIN PRESENT: ICD-10-PCS | Mod: HCNC,CPTII,S$GLB, | Performed by: NURSE PRACTITIONER

## 2023-11-20 PROCEDURE — 3074F SYST BP LT 130 MM HG: CPT | Mod: HCNC,CPTII,S$GLB, | Performed by: NURSE PRACTITIONER

## 2023-11-20 PROCEDURE — 99213 OFFICE O/P EST LOW 20 MIN: CPT | Mod: HCNC,S$GLB,, | Performed by: NURSE PRACTITIONER

## 2023-11-20 PROCEDURE — 1159F MED LIST DOCD IN RCRD: CPT | Mod: HCNC,CPTII,S$GLB, | Performed by: NURSE PRACTITIONER

## 2023-11-20 PROCEDURE — 1160F RVW MEDS BY RX/DR IN RCRD: CPT | Mod: HCNC,CPTII,S$GLB, | Performed by: NURSE PRACTITIONER

## 2023-11-20 PROCEDURE — 1101F PR PT FALLS ASSESS DOC 0-1 FALLS W/OUT INJ PAST YR: ICD-10-PCS | Mod: HCNC,CPTII,S$GLB, | Performed by: NURSE PRACTITIONER

## 2023-11-20 PROCEDURE — 1101F PT FALLS ASSESS-DOCD LE1/YR: CPT | Mod: HCNC,CPTII,S$GLB, | Performed by: NURSE PRACTITIONER

## 2023-11-20 PROCEDURE — 1126F AMNT PAIN NOTED NONE PRSNT: CPT | Mod: HCNC,CPTII,S$GLB, | Performed by: NURSE PRACTITIONER

## 2023-11-20 PROCEDURE — 99999 PR PBB SHADOW E&M-EST. PATIENT-LVL III: CPT | Mod: PBBFAC,HCNC,, | Performed by: NURSE PRACTITIONER

## 2023-11-20 PROCEDURE — 3078F PR MOST RECENT DIASTOLIC BLOOD PRESSURE < 80 MM HG: ICD-10-PCS | Mod: HCNC,CPTII,S$GLB, | Performed by: NURSE PRACTITIONER

## 2023-11-20 PROCEDURE — 99999 PR PBB SHADOW E&M-EST. PATIENT-LVL III: ICD-10-PCS | Mod: PBBFAC,HCNC,, | Performed by: NURSE PRACTITIONER

## 2023-11-20 PROCEDURE — 3288F FALL RISK ASSESSMENT DOCD: CPT | Mod: HCNC,CPTII,S$GLB, | Performed by: NURSE PRACTITIONER

## 2023-11-20 PROCEDURE — 1159F PR MEDICATION LIST DOCUMENTED IN MEDICAL RECORD: ICD-10-PCS | Mod: HCNC,CPTII,S$GLB, | Performed by: NURSE PRACTITIONER

## 2023-11-20 NOTE — PROGRESS NOTES
"Subjective:       Patient ID: Bhargav Ferguson is a 78 y.o. male.    Chief Complaint: Blister    Pt presents to clinic today for blister to his tongue  Noticed it yesterday while eating his breakfast  Reports it was big and red  Then while eating, it popped  Denies any pain today but would like it checked out    He also has a splinter to his right thumb  Was working out in the yard   Tried to get it out but couldn't        /60 (BP Location: Left arm)   Pulse 76   Temp 96.6 °F (35.9 °C)   Ht 5' 10" (1.778 m)   Wt 90.6 kg (199 lb 11.8 oz)   BMI 28.66 kg/m²     Review of Systems   Constitutional:  Negative for activity change, appetite change and chills.   HENT:  Positive for mouth sores.    Eyes: Negative.    Respiratory:  Negative for apnea, chest tightness, shortness of breath and stridor.    Cardiovascular:  Negative for chest pain, palpitations and leg swelling.   Gastrointestinal: Negative.    Endocrine: Negative.    Genitourinary: Negative.    Musculoskeletal:  Negative for arthralgias and myalgias.   Skin:  Positive for wound. Negative for color change, pallor and rash.   Allergic/Immunologic: Negative.    Neurological:  Negative for dizziness, facial asymmetry, light-headedness and headaches.   Hematological:  Negative for adenopathy.   Psychiatric/Behavioral:  Negative for agitation and behavioral problems.        Objective:      Physical Exam  Vitals and nursing note reviewed.   Constitutional:       General: He is not in acute distress.     Appearance: He is well-developed. He is not diaphoretic.   HENT:      Head: Normocephalic and atraumatic.   Cardiovascular:      Rate and Rhythm: Normal rate and regular rhythm.      Heart sounds: Normal heart sounds.   Pulmonary:      Effort: Pulmonary effort is normal. No respiratory distress.      Breath sounds: Normal breath sounds.   Skin:     General: Skin is warm and dry.      Findings: No rash.      Comments: Splinter noted to right thumb-used a 25 g " needle to open the area, removed splinter without problems. Pt tolerated well. No s/s of infection   Neurological:      Mental Status: He is alert and oriented to person, place, and time.   Psychiatric:         Behavior: Behavior normal.         Thought Content: Thought content normal.         Judgment: Judgment normal.         Assessment:       1. Disorder of skin and mucous membrane of mouth, lips, and tongue    2. Wood splinter in thumb    3. BMI 28.0-28.9,adult        Plan:       Bhargav was seen today for blister.    Diagnoses and all orders for this visit:    Disorder of skin and mucous membrane of mouth, lips, and tongue    Wood splinter in thumb    BMI 28.0-28.9,adult      Advised pt that the area of concern on his tongue appears blister like and we will monitor  If no improvement or not totally resolved in 1 week- pt to follow up so we can refer out for evaluation    Small splinter removed from right thumb  Pt tolerated well.     Follow up for worsening or no improvement in symptoms and PRN.

## 2023-12-08 ENCOUNTER — PATIENT OUTREACH (OUTPATIENT)
Dept: ADMINISTRATIVE | Facility: HOSPITAL | Age: 78
End: 2023-12-08
Payer: MEDICARE

## 2023-12-08 NOTE — PROGRESS NOTES
Working HTN report:     Called to discuss scheduling appointment and to get updated BP reading. Appt 12/19/2023 with PCP      
There are no Wet Read(s) to document.

## 2024-01-23 ENCOUNTER — OFFICE VISIT (OUTPATIENT)
Dept: URGENT CARE | Facility: CLINIC | Age: 79
End: 2024-01-23
Payer: MEDICARE

## 2024-01-23 VITALS
WEIGHT: 195 LBS | TEMPERATURE: 99 F | HEIGHT: 70 IN | HEART RATE: 80 BPM | DIASTOLIC BLOOD PRESSURE: 61 MMHG | RESPIRATION RATE: 16 BRPM | BODY MASS INDEX: 27.92 KG/M2 | OXYGEN SATURATION: 95 % | SYSTOLIC BLOOD PRESSURE: 123 MMHG

## 2024-01-23 DIAGNOSIS — B34.9 VIRAL INFECTION: Primary | ICD-10-CM

## 2024-01-23 DIAGNOSIS — R52 BODY ACHES: ICD-10-CM

## 2024-01-23 DIAGNOSIS — R19.7 DIARRHEA, UNSPECIFIED TYPE: ICD-10-CM

## 2024-01-23 DIAGNOSIS — R05.1 ACUTE COUGH: ICD-10-CM

## 2024-01-23 LAB
CTP QC/QA: YES
POC MOLECULAR INFLUENZA A AGN: NEGATIVE
POC MOLECULAR INFLUENZA B AGN: NEGATIVE

## 2024-01-23 PROCEDURE — 87502 INFLUENZA DNA AMP PROBE: CPT | Mod: QW,S$GLB,, | Performed by: NURSE PRACTITIONER

## 2024-01-23 PROCEDURE — 99213 OFFICE O/P EST LOW 20 MIN: CPT | Mod: S$GLB,,, | Performed by: NURSE PRACTITIONER

## 2024-01-23 NOTE — PATIENT INSTRUCTIONS
Your tests for Influenza today were Negative  Out of the bed as much as possible   Increase your hydration with clear liquids/electrolytes  Bananas, Rice, Applesauce, and/or Toast to help firm up your stool  Take Tylenol and alternate with motrin for fever, pain, and/or body aches if applicable  Call your doctor today for schedule a follow up appointment     If symptoms persists or worsen, follow up with PCP or RTC, or if SOB, chest pain, difficulty breathing occurs, go to the nearest ER

## 2024-01-23 NOTE — PROGRESS NOTES
"Subjective:      Patient ID: Bhargav Ferguson is a 78 y.o. male.    Vitals:  height is 5' 10" (1.778 m) and weight is 88.5 kg (195 lb). His tympanic temperature is 99.4 °F (37.4 °C). His blood pressure is 123/61 and his pulse is 80. His respiration is 16 and oxygen saturation is 95%.     Chief Complaint: Generalized Body Aches    Patient presents with upset stomach, body aches, sore throat, headache, sinus pressure, and cough that began Monday. He has been taking OTC Aleve (last dose around 3pm).    Patient only wants to be tested for Influenza virus    Sinus Problem  This is a new problem. The current episode started yesterday. The problem has been gradually worsening since onset. There has been no fever. Associated symptoms include coughing, headaches, neck pain, sinus pressure and a sore throat. Pertinent negatives include no chills, congestion, diaphoresis, ear pain, hoarse voice, shortness of breath, sneezing or swollen glands. Treatments tried: NSAIDs.       Constitution: Negative for chills and sweating.   HENT:  Positive for sinus pressure and sore throat. Negative for ear pain and congestion.    Neck: Positive for neck pain.   Respiratory:  Positive for cough. Negative for shortness of breath.    Gastrointestinal:  Positive for diarrhea. Negative for nausea and vomiting.   Allergic/Immunologic: Negative for sneezing.   Neurological:  Positive for headaches.      Objective:     Physical Exam   Constitutional: He appears well-developed. He is cooperative.  Non-toxic appearance. He does not appear ill. No distress. well-groomedawake  HENT:   Head: Normocephalic and atraumatic.   Ears:   Right Ear: External ear normal.   Left Ear: External ear normal.   Nose: Nose normal.   Eyes: Conjunctivae and EOM are normal.   Neck: Neck supple.   Cardiovascular: Normal rate and regular rhythm.   Pulmonary/Chest: Effort normal and breath sounds normal. No stridor. No respiratory distress. He has no wheezes. He has no " rhonchi. He has no rales.   Abdominal: Normal appearance.   Musculoskeletal: Normal range of motion.         General: Normal range of motion.   Neurological: no focal deficit. He is alert. He displays no weakness. Gait normal.   Skin: Skin is warm, dry, not diaphoretic, not pale and no rash.   Psychiatric: He experiences Normal attention and Normal perception. His speech is normal and behavior is normal. Mood, memory and affect normal. Cognition normal  Nursing note and vitals reviewed.      Assessment:     1. Viral infection    2. Body aches    3. Acute cough    4. Diarrhea, unspecified type        Plan:       Viral infection    Body aches  -     POCT Influenza A/B MOLECULAR    Acute cough    Diarrhea, unspecified type      Discussed results with patient he may be testing too early for Influenza virus  Discussed use of OTC medications for symptom control as this may be a viral illness   Discussed OOB as much as possible, Tylenol/Ibuprofen for fever, pain, and body aches, and to increase hydration  All ER precautions covered including but not limited to shortness of breath, difficulty breathing, intractable fever, or chest pain.  Discussed RTC or follow up with PCP if symptoms worsen, change, or persist.   Patient verbalized understanding and agreed with the plan of care.   IVANNA Enrique NP        Patient Instructions   Your tests for Influenza today were Negative  Out of the bed as much as possible   Increase your hydration with clear liquids/electrolytes  Bananas, Rice, Applesauce, and/or Toast to help firm up your stool  Take Tylenol and alternate with motrin for fever, pain, and/or body aches if applicable  Call your doctor today for schedule a follow up appointment     If symptoms persists or worsen, follow up with PCP or RTC, or if SOB, chest pain, difficulty breathing occurs, go to the nearest ER

## 2024-02-19 ENCOUNTER — TELEPHONE (OUTPATIENT)
Dept: INTERNAL MEDICINE | Facility: CLINIC | Age: 79
End: 2024-02-19
Payer: MEDICARE

## 2024-02-19 ENCOUNTER — TELEPHONE (OUTPATIENT)
Dept: FAMILY MEDICINE | Facility: CLINIC | Age: 79
End: 2024-02-19
Payer: MEDICARE

## 2024-02-19 NOTE — TELEPHONE ENCOUNTER
----- Message from Liv De La Garza sent at 2/19/2024  9:40 AM CST -----  Regarding: apot needed  Name of who is calling:   Bhargav      What is the request in detail: Pt is requesting a call back in  ref to est care and get medication refills. Needs sooner appt than 03/20/2024 due to medications only has a week left      Can the clinic reply by [facility]:no      What number to call back if not [facility]:[phone]

## 2024-02-19 NOTE — TELEPHONE ENCOUNTER
Lm for pt to call office back to see if Dr. Cedeno will send him in a 30 day of his rx or he can come in and see Yumiko sooner.

## 2024-02-19 NOTE — TELEPHONE ENCOUNTER
Spoke with with letting her know that her  still has refills left at [facility] and they can transfer his medication to whatever pharmacy they choose. Patient verbalized understanding

## 2024-02-19 NOTE — TELEPHONE ENCOUNTER
----- Message from Jesus Klein sent at 2/19/2024  1:59 PM CST -----  Contact: wife [phone]  Type:  Patient Returning Call    Who Called:Wife   Who Left Message for Patient:Ele  Does the patient know what this is regarding?:pt   Would the patient rather a call back or a response via [facility]? Call back   Best Call Back Number:[phone]  Additional Information:

## 2025-04-28 ENCOUNTER — HOSPITAL ENCOUNTER (OUTPATIENT)
Dept: CARDIOLOGY | Facility: HOSPITAL | Age: 80
Discharge: HOME | End: 2025-04-28
Attending: INTERNAL MEDICINE
Payer: MEDICARE

## 2025-04-28 ENCOUNTER — OFFICE VISIT (OUTPATIENT)
Dept: CARDIOLOGY | Facility: CLINIC | Age: 80
End: 2025-04-28
Payer: MEDICARE

## 2025-04-28 VITALS
DIASTOLIC BLOOD PRESSURE: 78 MMHG | OXYGEN SATURATION: 98 % | WEIGHT: 199.31 LBS | BODY MASS INDEX: 28.6 KG/M2 | HEART RATE: 65 BPM | SYSTOLIC BLOOD PRESSURE: 124 MMHG

## 2025-04-28 DIAGNOSIS — E78.49 OTHER HYPERLIPIDEMIA: ICD-10-CM

## 2025-04-28 DIAGNOSIS — I10 ESSENTIAL HYPERTENSION: ICD-10-CM

## 2025-04-28 DIAGNOSIS — Z76.89 ESTABLISHING CARE WITH NEW DOCTOR, ENCOUNTER FOR: Primary | ICD-10-CM

## 2025-04-28 DIAGNOSIS — R42 DIZZINESS: ICD-10-CM

## 2025-04-28 DIAGNOSIS — R07.9 CHEST PAIN, UNSPECIFIED TYPE: Primary | ICD-10-CM

## 2025-04-28 DIAGNOSIS — Z76.89 ESTABLISHING CARE WITH NEW DOCTOR, ENCOUNTER FOR: ICD-10-CM

## 2025-04-28 DIAGNOSIS — I34.0 MITRAL VALVE INSUFFICIENCY, UNSPECIFIED ETIOLOGY: ICD-10-CM

## 2025-04-28 DIAGNOSIS — K21.9 GASTROESOPHAGEAL REFLUX DISEASE, UNSPECIFIED WHETHER ESOPHAGITIS PRESENT: ICD-10-CM

## 2025-04-28 DIAGNOSIS — R09.89 CAROTID BRUIT, UNSPECIFIED LATERALITY: ICD-10-CM

## 2025-04-28 LAB
OHS QRS DURATION: 100 MS
OHS QTC CALCULATION: 386 MS

## 2025-04-28 PROCEDURE — 99999 PR PBB SHADOW E&M-EST. PATIENT-LVL III: CPT | Mod: PBBFAC,,, | Performed by: INTERNAL MEDICINE

## 2025-04-28 PROCEDURE — 3074F SYST BP LT 130 MM HG: CPT | Mod: CPTII,S$GLB,, | Performed by: INTERNAL MEDICINE

## 2025-04-28 PROCEDURE — 3078F DIAST BP <80 MM HG: CPT | Mod: CPTII,S$GLB,, | Performed by: INTERNAL MEDICINE

## 2025-04-28 PROCEDURE — 3288F FALL RISK ASSESSMENT DOCD: CPT | Mod: CPTII,S$GLB,, | Performed by: INTERNAL MEDICINE

## 2025-04-28 PROCEDURE — 99205 OFFICE O/P NEW HI 60 MIN: CPT | Mod: S$GLB,,, | Performed by: INTERNAL MEDICINE

## 2025-04-28 PROCEDURE — 93005 ELECTROCARDIOGRAM TRACING: CPT | Mod: PO

## 2025-04-28 PROCEDURE — 1160F RVW MEDS BY RX/DR IN RCRD: CPT | Mod: CPTII,S$GLB,, | Performed by: INTERNAL MEDICINE

## 2025-04-28 PROCEDURE — 1101F PT FALLS ASSESS-DOCD LE1/YR: CPT | Mod: CPTII,S$GLB,, | Performed by: INTERNAL MEDICINE

## 2025-04-28 PROCEDURE — 1125F AMNT PAIN NOTED PAIN PRSNT: CPT | Mod: CPTII,S$GLB,, | Performed by: INTERNAL MEDICINE

## 2025-04-28 PROCEDURE — G2211 COMPLEX E/M VISIT ADD ON: HCPCS | Mod: S$GLB,,, | Performed by: INTERNAL MEDICINE

## 2025-04-28 PROCEDURE — 93010 ELECTROCARDIOGRAM REPORT: CPT | Mod: ,,, | Performed by: INTERNAL MEDICINE

## 2025-04-28 PROCEDURE — 1159F MED LIST DOCD IN RCRD: CPT | Mod: CPTII,S$GLB,, | Performed by: INTERNAL MEDICINE

## 2025-04-28 NOTE — PROGRESS NOTES
Subjective:   Patient ID:  Bhargav Ferguson is a 79 y.o. male who presents for cardiac consult of Chest Pain, Arm Pain, Establish Care, and Fatigue      Referral by: Self, Aaareferral  No address on file     Reason for consult:       HPI  The patient came in today for cardiac consult of Chest Pain, Arm Pain, Establish Care, and Fatigue      Bhargav Ferguson is a 79 y.o. male pt with HTN, HLD, GERD presents for CVD eval.         7/2023 - Kenney Cuevas MD - 07/13/2023 10:45 AM CDT   HPI:   Patient presents to the clinic today for complaints of chest pain. He was admitted to the hospital yesterday with chest pain. Chest pain started day before yesterday night at around 1:30 AM when he woke up with chest pain. At around 5 AM apparently chest pain subsided and he went on to carry on his activities. Later on when he climbed up the ladder he started having the chest pain. He went home and rested and in the evening when he woke up he still felt some chest discomfort and subsequently presented to the emergency room for further evaluation and management. Serial troponins were negative. EKG showed septal infarct age undetermined in the hospital with no acute ST-T segment changes. After the Nitropaste was applied, his symptoms reportedly resolved and he has not had any more recurrence of symptoms. He was ambulated in the hallway several times with no problems and subsequently was discharged home.  His chest pain was located in the retrosternal region and was associated with some shortness of breath and tingling in the left arm. Has not had the symptoms before. He denies any lower extremity edema. He had mild lightheadedness and dizziness. He denies any palpitations. He denies any syncopal episodes.  EKG done in the clinic today shows sinus bradycardia at 55 bpm, normal axis, no significant ST-T wave abnormalities in contiguous leads.     4/28/25  Pt presents for initial eval here but had seen cardiologist in 2023 for  chest pain - stress ECHO neg for ischemia 7/2023.     BP and HR stable today. Weight - 199 lbs  Pt has been to Dr. Sumner in past.   HE is active but has been having CP and more fatigue lately  Has occ dizziness.     ECG - NSR , 1s deg AVB  He does air conditioning          No cardiac monitor results found for the past 12 months     CONCLUSION 7/14/23  1. Low risk treadmill stress echocardiogram.  2. Expected blood pressure response.  3. Average exercise tolerance for age and gender.      [facility]     Past Medical History:   Diagnosis Date    Arthritis     Elevated PSA     questionable hx of prostate CA    HTN (hypertension)        Past Surgical History:   Procedure Laterality Date    APPENDECTOMY      HEEL SPUR SURGERY Right     TOTAL KNEE ARTHROPLASTY Left        Social History[1]    Family History   Problem Relation Name Age of Onset    Cancer Mother          brain    Diabetes Father      Prostate cancer Neg Hx         Patient's Medications   New Prescriptions    No medications on file   Previous Medications    LOSARTAN-HYDROCHLOROTHIAZIDE 100-12.5 MG (HYZAAR) 100-12.5 MG TAB    Take 1 tablet by mouth once daily.    MULTIVITAMIN (THERAGRAN) PER TABLET    Take 1 tablet by mouth once daily.    ROSUVASTATIN (CRESTOR) 20 MG TABLET    Take 1 tablet (20 mg total) by mouth once daily.   Modified Medications    No medications on file   Discontinued Medications    No medications on file       Review of Systems   Constitutional:  Positive for malaise/fatigue.   HENT: Negative.     Eyes: Negative.    Respiratory:  Positive for shortness of breath.    Cardiovascular:  Positive for chest pain.   Gastrointestinal: Negative.    Genitourinary: Negative.    Musculoskeletal: Negative.    Skin: Negative.    Neurological: Negative.    Endo/Heme/Allergies: Negative.    Psychiatric/Behavioral: Negative.     All 12 systems otherwise negative.      Wt Readings from Last 3 Encounters:   04/28/25 90.4 kg (199 lb 4.7  oz)   01/23/24 88.5 kg (195 lb)   11/20/23 90.6 kg (199 lb 11.8 oz)     Temp Readings from Last 3 Encounters:   01/23/24 99.4 °F (37.4 °C) (Tympanic)   11/20/23 96.6 °F (35.9 °C)   08/08/23 97.6 °F (36.4 °C) (Oral)     BP Readings from Last 3 Encounters:   04/28/25 124/78   01/23/24 123/61   11/20/23 104/60     Pulse Readings from Last 3 Encounters:   04/28/25 65   01/23/24 80   11/20/23 76       /78 (BP Location: Left arm, Patient Position: Sitting)   Pulse 65   Wt 90.4 kg (199 lb 4.7 oz)   SpO2 98%   BMI 28.60 kg/m²     Objective:   Physical Exam  Vitals and nursing note reviewed.   Constitutional:       General: He is not in acute distress.     Appearance: He is well-developed. He is not diaphoretic.   HENT:      Head: Normocephalic and atraumatic.      Nose: Nose normal.   Eyes:      General: No scleral icterus.     Conjunctiva/sclera: Conjunctivae normal.   Neck:      Thyroid: No thyromegaly.      Vascular: No JVD.   Cardiovascular:      Rate and Rhythm: Normal rate and regular rhythm.      Heart sounds: S1 normal and S2 normal. Murmur heard.      No friction rub. No gallop. No S3 or S4 sounds.   Pulmonary:      Effort: Pulmonary effort is normal. No respiratory distress.      Breath sounds: Normal breath sounds. No stridor. No wheezing or rales.   Chest:      Chest wall: No tenderness.   Abdominal:      General: Bowel sounds are normal. There is no distension.      Palpations: Abdomen is soft. There is no mass.      Tenderness: There is no abdominal tenderness. There is no rebound.   Genitourinary:     Comments: Deferred  Musculoskeletal:         General: No tenderness or deformity. Normal range of motion.      Cervical back: Normal range of motion and neck supple.   Lymphadenopathy:      Cervical: No cervical adenopathy.   Skin:     General: Skin is warm and dry.      Coloration: Skin is not pale.      Findings: No erythema or rash.   Neurological:      Mental Status: He is alert and oriented to  person, place, and time.      Motor: No abnormal muscle tone.      Coordination: Coordination normal.   Psychiatric:         Behavior: Behavior normal.         Thought Content: Thought content normal.         Judgment: Judgment normal.         Lab Results   Component Value Date     03/08/2023    K 4.7 03/08/2023     03/08/2023    CO2 27 03/08/2023    BUN 20 08/08/2023    CREATININE 1.2 08/08/2023    GLU 94 03/08/2023    HGBA1C 5.8 (H) 11/08/2024    HGBA1C 5.7 (H) 03/08/2023    AST 23 03/08/2023    ALT 23 03/08/2023    ALBUMIN 4.0 03/08/2023    PROT 7.2 03/08/2023    BILITOT 0.7 03/08/2023    WBC 6.70 03/08/2023    HGB 13.0 (L) 03/08/2023    HCT 42.5 03/08/2023    MCV 94 03/08/2023     03/08/2023    INR 1.0 10/11/2022    TSH 2.2 05/15/2024    TSH 2.083 03/08/2023    CHOL 141 11/08/2024    CHOL 187 03/08/2023    HDL 51 11/08/2024    HDL 48 03/08/2023    LDLCALC 72 11/08/2024    LDLCALC 117.6 03/08/2023    TRIG 98 11/08/2024    TRIG 107 03/08/2023         INR (no units)   Date Value   10/11/2022 1.0          Assessment:      1. Chest pain, unspecified type    2. Essential hypertension    3. Other hyperlipidemia    4. Gastroesophageal reflux disease, unspecified whether esophagitis present    5. Carotid bruit, unspecified laterality    6. Dizziness        Plan:       Chest pain, occ, dizziness  - neg stress echo in 2023  - order exercise nuclear stress test and ECHO   - order carotid u/s     2. HTN  - titrate meds    3. HLD, PreDM   - was on statin  - repeat labs ordered     4. GERD  - cont PPI - takes Nexium    Visit today included increased complexity associated with the care of the episodic problem chest pain addressed and managing the longitudinal care of the patient due to the serious and/or complex managed problem(s) .      Thank you for allowing me to participate in this patient's care. Please do not hesitate to contact me with any questions or concerns. Consult note has been forwarded to the  referral physician.     Adiel Romero MD, [facility]  Cardiovascular Disease  [facility], [place]  [phone] (P)           [1]   Social History  Tobacco Use    Smoking status: Never    Smokeless tobacco: Never   Substance Use Topics    Drug use: Never

## 2025-04-29 ENCOUNTER — LAB VISIT (OUTPATIENT)
Dept: LAB | Facility: HOSPITAL | Age: 80
End: 2025-04-29
Attending: INTERNAL MEDICINE
Payer: MEDICARE

## 2025-04-29 DIAGNOSIS — E78.49 OTHER HYPERLIPIDEMIA: ICD-10-CM

## 2025-04-29 LAB
ALBUMIN SERPL BCP-MCNC: 3.9 G/DL (ref 3.5–5.2)
ALP SERPL-CCNC: 69 UNIT/L (ref 40–150)
ALT SERPL W/O P-5'-P-CCNC: 29 UNIT/L (ref 10–44)
ANION GAP (OHS): 9 MMOL/L (ref 8–16)
AST SERPL-CCNC: 23 UNIT/L (ref 11–45)
BILIRUB SERPL-MCNC: 0.9 MG/DL (ref 0.1–1)
BUN SERPL-MCNC: 27 MG/DL (ref 8–23)
CALCIUM SERPL-MCNC: 9.4 MG/DL (ref 8.7–10.5)
CHLORIDE SERPL-SCNC: 105 MMOL/L (ref 95–110)
CHOLEST SERPL-MCNC: 207 MG/DL (ref 120–199)
CHOLEST/HDLC SERPL: 4.4 {RATIO} (ref 2–5)
CK SERPL-CCNC: 91 U/L (ref 20–200)
CO2 SERPL-SCNC: 27 MMOL/L (ref 23–29)
CREAT SERPL-MCNC: 1.3 MG/DL (ref 0.5–1.4)
GFR SERPLBLD CREATININE-BSD FMLA CKD-EPI: 56 ML/MIN/1.73/M2
GLUCOSE SERPL-MCNC: 96 MG/DL (ref 70–110)
HDLC SERPL-MCNC: 47 MG/DL (ref 40–75)
HDLC SERPL: 22.7 % (ref 20–50)
LDLC SERPL CALC-MCNC: 141.6 MG/DL (ref 63–159)
NONHDLC SERPL-MCNC: 160 MG/DL
POTASSIUM SERPL-SCNC: 4.3 MMOL/L (ref 3.5–5.1)
PROT SERPL-MCNC: 7.2 GM/DL (ref 6–8.4)
SODIUM SERPL-SCNC: 141 MMOL/L (ref 136–145)
TRIGL SERPL-MCNC: 92 MG/DL (ref 30–150)

## 2025-04-29 PROCEDURE — 36415 COLL VENOUS BLD VENIPUNCTURE: CPT | Mod: PO

## 2025-04-29 PROCEDURE — 80053 COMPREHEN METABOLIC PANEL: CPT

## 2025-04-29 PROCEDURE — 80061 LIPID PANEL: CPT

## 2025-04-29 PROCEDURE — 82550 ASSAY OF CK (CPK): CPT

## 2025-04-30 ENCOUNTER — RESULTS FOLLOW-UP (OUTPATIENT)
Dept: CARDIOLOGY | Facility: CLINIC | Age: 80
End: 2025-04-30

## 2025-04-30 ENCOUNTER — TELEPHONE (OUTPATIENT)
Dept: CARDIOLOGY | Facility: CLINIC | Age: 80
End: 2025-04-30
Payer: MEDICARE

## 2025-04-30 RX ORDER — ROSUVASTATIN CALCIUM 20 MG/1
20 TABLET, COATED ORAL DAILY
Qty: 90 TABLET | Refills: 3 | Status: SHIPPED | OUTPATIENT
Start: 2025-04-30

## 2025-04-30 NOTE — TELEPHONE ENCOUNTER
Contacted pt wife in regards to results she stated understanding.  ----- Message from Adiel Romero MD sent at 4/30/2025  7:54 AM CDT -----  Please contact the patient and let them know that their results reveal worsening lipids - recommend restarting Crestor  - I have sent it to the pharmacy. Will repeat labs after follow up.   ----- Message -----  From: Lab, Background User  Sent: 4/29/2025   2:45 PM CDT  To: Adiel Romero MD

## 2025-05-09 ENCOUNTER — HOSPITAL ENCOUNTER (OUTPATIENT)
Dept: CARDIOLOGY | Facility: HOSPITAL | Age: 80
Discharge: HOME | End: 2025-05-09
Attending: INTERNAL MEDICINE
Payer: MEDICARE

## 2025-05-09 ENCOUNTER — HOSPITAL ENCOUNTER (OUTPATIENT)
Dept: RADIOLOGY | Facility: HOSPITAL | Age: 80
Discharge: HOME | End: 2025-05-09
Attending: INTERNAL MEDICINE
Payer: MEDICARE

## 2025-05-09 VITALS
BODY MASS INDEX: 28.49 KG/M2 | WEIGHT: 199 LBS | HEIGHT: 70 IN | DIASTOLIC BLOOD PRESSURE: 71 MMHG | SYSTOLIC BLOOD PRESSURE: 144 MMHG

## 2025-05-09 VITALS
BODY MASS INDEX: 28.49 KG/M2 | HEIGHT: 70 IN | SYSTOLIC BLOOD PRESSURE: 124 MMHG | DIASTOLIC BLOOD PRESSURE: 78 MMHG | WEIGHT: 199 LBS

## 2025-05-09 DIAGNOSIS — R42 DIZZINESS: ICD-10-CM

## 2025-05-09 DIAGNOSIS — K21.9 GASTROESOPHAGEAL REFLUX DISEASE, UNSPECIFIED WHETHER ESOPHAGITIS PRESENT: ICD-10-CM

## 2025-05-09 DIAGNOSIS — I10 ESSENTIAL HYPERTENSION: ICD-10-CM

## 2025-05-09 DIAGNOSIS — E78.49 OTHER HYPERLIPIDEMIA: ICD-10-CM

## 2025-05-09 DIAGNOSIS — R09.89 CAROTID BRUIT, UNSPECIFIED LATERALITY: ICD-10-CM

## 2025-05-09 DIAGNOSIS — R07.9 CHEST PAIN, UNSPECIFIED TYPE: ICD-10-CM

## 2025-05-09 LAB
AORTIC ROOT ANNULUS: 2.6 CM
AORTIC SIZE INDEX: 1.3 CM/M2
ASCENDING AORTA: 2.8 CM
AV INDEX (PROSTH): 0.9
AV MEAN GRADIENT: 2 MMHG
AV PEAK GRADIENT: 4 MMHG
AV VALVE AREA BY VELOCITY RATIO: 2.8 CM²
AV VALVE AREA: 2.8 CM²
AV VELOCITY RATIO: 0.9
BSA FOR ECHO PROCEDURE: 2.11 M2
CV ECHO LV RWT: 0.39 CM
CV STRESS BASE HR: 65 BPM
DIASTOLIC BLOOD PRESSURE: 62 MMHG
DOP CALC AO PEAK VEL: 1 M/S
DOP CALC AO VTI: 23.1 CM
DOP CALC LVOT AREA: 3.1 CM2
DOP CALC LVOT DIAMETER: 2 CM
DOP CALC LVOT PEAK VEL: 0.9 M/S
DOP CALC LVOT STROKE VOLUME: 65.3 CM3
DOP CALC RVOT PEAK VEL: 0.43 M/S
DOP CALC RVOT VTI: 10.8 CM
DOP CALCLVOT PEAK VEL VTI: 20.8 CM
E WAVE DECELERATION TIME: 225 MSEC
E/A RATIO: 0.64
E/E' RATIO: 7 M/S
ECHO LV POSTERIOR WALL: 1 CM (ref 0.6–1.1)
EJECTION FRACTION- HIGH: 73 %
END DIASTOLIC INDEX-HIGH: 165 ML/M2
END DIASTOLIC INDEX-LOW: 101 ML/M2
END SYSTOLIC INDEX-HIGH: 64 ML/M2
END SYSTOLIC INDEX-LOW: 28 ML/M2
FRACTIONAL SHORTENING: 37.3 % (ref 28–44)
INTERVENTRICULAR SEPTUM: 0.9 CM (ref 0.6–1.1)
IVC DIAMETER: 1.78 CM
LA MAJOR: 5.1 CM
LA MINOR: 4.6 CM
LA WIDTH: 3.6 CM
LEFT ARM DIASTOLIC BLOOD PRESSURE: 71 MMHG
LEFT ARM SYSTOLIC BLOOD PRESSURE: 144 MMHG
LEFT ATRIUM AREA SYSTOLIC (APICAL 2 CHAMBER): 16.4 CM2
LEFT ATRIUM AREA SYSTOLIC (APICAL 4 CHAMBER): 18.07 CM2
LEFT ATRIUM SIZE: 3.9 CM
LEFT ATRIUM VOLUME INDEX MOD: 24 ML/M2
LEFT ATRIUM VOLUME INDEX: 28 ML/M2
LEFT ATRIUM VOLUME MOD: 50 ML
LEFT ATRIUM VOLUME: 58 CM3
LEFT CBA DIAS: 13 CM/S
LEFT CBA SYS: 66 CM/S
LEFT CCA DIST DIAS: 23 CM/S
LEFT CCA DIST SYS: 88 CM/S
LEFT CCA MID DIAS: 28 CM/S
LEFT CCA MID SYS: 109 CM/S
LEFT CCA PROX DIAS: 25 CM/S
LEFT CCA PROX SYS: 128 CM/S
LEFT ECA DIAS: 13 CM/S
LEFT ECA SYS: 106 CM/S
LEFT ICA DIST DIAS: 35 CM/S
LEFT ICA DIST SYS: 104 CM/S
LEFT ICA MID DIAS: 29 CM/S
LEFT ICA MID SYS: 91 CM/S
LEFT ICA PROX DIAS: 18 CM/S
LEFT ICA PROX SYS: 63 CM/S
LEFT INTERNAL DIMENSION IN SYSTOLE: 3.2 CM (ref 2.1–4)
LEFT VENTRICLE DIASTOLIC VOLUME INDEX: 59.13 ML/M2
LEFT VENTRICLE DIASTOLIC VOLUME: 123 ML
LEFT VENTRICLE END SYSTOLIC VOLUME APICAL 2 CHAMBER: 46.3 ML
LEFT VENTRICLE END SYSTOLIC VOLUME APICAL 4 CHAMBER: 48.82 ML
LEFT VENTRICLE MASS INDEX: 84.4 G/M2
LEFT VENTRICLE SYSTOLIC VOLUME INDEX: 19.2 ML/M2
LEFT VENTRICLE SYSTOLIC VOLUME: 40 ML
LEFT VENTRICULAR INTERNAL DIMENSION IN DIASTOLE: 5.1 CM (ref 3.5–6)
LEFT VENTRICULAR MASS: 175.6 G
LEFT VERTEBRAL DIAS: 7 CM/S
LEFT VERTEBRAL SYS: 37 CM/S
LV LATERAL E/E' RATIO: 6 M/S
LV SEPTAL E/E' RATIO: 9 M/S
LVED V (TEICH): 122.54 ML
LVES V (TEICH): 39.74 ML
LVOT MG: 1.48 MMHG
LVOT MV: 0.56 CM/S
MV PEAK A VEL: 0.85 M/S
MV PEAK E VEL: 0.54 M/S
MV STENOSIS PRESSURE HALF TIME: 65.28 MS
MV VALVE AREA P 1/2 METHOD: 3.37 CM2
NUC REST EJECTION FRACTION: 60
NUC STRESS EJECTION FRACTION: 74 %
OHS CV CAROTID RIGHT ICA EDV HIGHEST: 31
OHS CV CAROTID ULTRASOUND LEFT ICA/CCA RATIO: 1.18
OHS CV CAROTID ULTRASOUND RIGHT ICA/CCA RATIO: 1.16
OHS CV CPX 85 PERCENT MAX PREDICTED HEART RATE MALE: 121
OHS CV CPX ESTIMATED METS: 7
OHS CV CPX MAX PREDICTED HEART RATE: 142
OHS CV CPX PATIENT IS FEMALE: 0
OHS CV CPX PATIENT IS MALE: 1
OHS CV CPX PEAK DIASTOLIC BLOOD PRESSURE: 49 MMHG
OHS CV CPX PEAK HEAR RATE: 127 BPM
OHS CV CPX PEAK RATE PRESSURE PRODUCT: NORMAL
OHS CV CPX PEAK SYSTOLIC BLOOD PRESSURE: 173 MMHG
OHS CV CPX PERCENT MAX PREDICTED HEART RATE ACHIEVED: 89
OHS CV CPX RATE PRESSURE PRODUCT PRESENTING: 8515
OHS CV INITIAL DOSE: 9.6 MCG/KG/MIN
OHS CV PEAK DOSE: 28 MCG/KG/MIN
OHS CV PV CAROTID LEFT HIGHEST CCA: 128
OHS CV PV CAROTID LEFT HIGHEST ICA: 104
OHS CV PV CAROTID RIGHT HIGHEST CCA: 135
OHS CV PV CAROTID RIGHT HIGHEST ICA: 111
OHS CV RV/LV RATIO: 0.67 CM
OHS CV US CAROTID LEFT HIGHEST EDV: 35
PISA TR MAX VEL: 2.1 M/S
PV MEAN GRADIENT: 0 MMHG
PV MV: 0.74 M/S
PV PEAK GRADIENT: 4 MMHG
PV PEAK VELOCITY: 1.06 M/S
RA MAJOR: 4.36 CM
RA PRESSURE ESTIMATED: 3 MMHG
RA WIDTH: 3.6 CM
RETIRED EF AND QEF - SEE NOTES: 59 %
RIGHT ARM DIASTOLIC BLOOD PRESSURE: 74 MMHG
RIGHT ARM SYSTOLIC BLOOD PRESSURE: 143 MMHG
RIGHT CBA DIAS: 20 CM/S
RIGHT CBA SYS: 106 CM/S
RIGHT CCA DIST DIAS: 20 CM/S
RIGHT CCA DIST SYS: 96 CM/S
RIGHT CCA MID DIAS: 26 CM/S
RIGHT CCA MID SYS: 135 CM/S
RIGHT CCA PROX DIAS: 20 CM/S
RIGHT CCA PROX SYS: 125 CM/S
RIGHT ECA DIAS: 7 CM/S
RIGHT ECA SYS: 79 CM/S
RIGHT ICA DIST DIAS: 31 CM/S
RIGHT ICA DIST SYS: 111 CM/S
RIGHT ICA MID DIAS: 29 CM/S
RIGHT ICA MID SYS: 101 CM/S
RIGHT ICA PROX DIAS: 23 CM/S
RIGHT ICA PROX SYS: 97 CM/S
RIGHT VENTRICLE DIASTOLIC BASEL DIMENSION: 3.4 CM
RIGHT VENTRICULAR END-DIASTOLIC DIMENSION: 3.41 CM
RIGHT VERTEBRAL DIAS: 13 CM/S
RIGHT VERTEBRAL SYS: 61 CM/S
RV TB RVSP: 5 MMHG
SINUS: 2.86 CM
STJ: 2.6 CM
STRESS ECHO POST EXERCISE DUR MIN: 6 MINUTES
STRESS ECHO POST EXERCISE DUR SEC: 0 SECONDS
STRESS ST DEPRESSION: 0.5 MM
SYSTOLIC BLOOD PRESSURE: 131 MMHG
TDI LATERAL: 0.09 M/S
TDI SEPTAL: 0.06 M/S
TDI: 0.08 M/S
TR MAX PG: 18 MMHG
TV REST PULMONARY ARTERY PRESSURE: 21 MMHG
Z-SCORE OF LEFT VENTRICULAR DIMENSION IN END DIASTOLE: -2.22
Z-SCORE OF LEFT VENTRICULAR DIMENSION IN END SYSTOLE: -1.55

## 2025-05-09 PROCEDURE — 93017 CV STRESS TEST TRACING ONLY: CPT

## 2025-05-09 PROCEDURE — 78452 HT MUSCLE IMAGE SPECT MULT: CPT

## 2025-05-09 PROCEDURE — 93306 TTE W/DOPPLER COMPLETE: CPT | Mod: 26,,, | Performed by: INTERNAL MEDICINE

## 2025-05-09 PROCEDURE — 93306 TTE W/DOPPLER COMPLETE: CPT

## 2025-05-09 PROCEDURE — 93880 EXTRACRANIAL BILAT STUDY: CPT | Mod: 26,,, | Performed by: INTERNAL MEDICINE

## 2025-05-09 PROCEDURE — 93880 EXTRACRANIAL BILAT STUDY: CPT

## 2025-05-09 PROCEDURE — A9502 TC99M TETROFOSMIN: HCPCS | Performed by: INTERNAL MEDICINE

## 2025-05-09 RX ADMIN — TETROFOSMIN 9.6 MILLICURIE: 1.38 INJECTION, POWDER, LYOPHILIZED, FOR SOLUTION INTRAVENOUS at 08:05

## 2025-05-09 RX ADMIN — TETROFOSMIN 28 MILLICURIE: 1.38 INJECTION, POWDER, LYOPHILIZED, FOR SOLUTION INTRAVENOUS at 10:05

## 2025-08-12 ENCOUNTER — OFFICE VISIT (OUTPATIENT)
Dept: CARDIOLOGY | Facility: CLINIC | Age: 80
End: 2025-08-12
Payer: MEDICARE

## 2025-08-12 ENCOUNTER — HOSPITAL ENCOUNTER (OUTPATIENT)
Dept: CARDIOLOGY | Facility: HOSPITAL | Age: 80
Discharge: HOME | End: 2025-08-12
Payer: MEDICARE

## 2025-08-12 VITALS
SYSTOLIC BLOOD PRESSURE: 118 MMHG | WEIGHT: 198.44 LBS | DIASTOLIC BLOOD PRESSURE: 62 MMHG | BODY MASS INDEX: 28.41 KG/M2 | HEIGHT: 70 IN | HEART RATE: 68 BPM

## 2025-08-12 DIAGNOSIS — R73.01 IMPAIRED FASTING GLUCOSE: ICD-10-CM

## 2025-08-12 DIAGNOSIS — R07.89 ATYPICAL CHEST PAIN: ICD-10-CM

## 2025-08-12 DIAGNOSIS — I34.0 NONRHEUMATIC MITRAL VALVE REGURGITATION: ICD-10-CM

## 2025-08-12 DIAGNOSIS — R07.89 ATYPICAL CHEST PAIN: Primary | ICD-10-CM

## 2025-08-12 DIAGNOSIS — I10 ESSENTIAL HYPERTENSION: ICD-10-CM

## 2025-08-12 DIAGNOSIS — I65.23 BILATERAL CAROTID ARTERY STENOSIS: ICD-10-CM

## 2025-08-12 DIAGNOSIS — E78.5 HYPERLIPIDEMIA, UNSPECIFIED HYPERLIPIDEMIA TYPE: ICD-10-CM

## 2025-08-12 PROBLEM — I77.9 BILATERAL CAROTID ARTERY DISEASE: Status: ACTIVE | Noted: 2025-08-12

## 2025-08-12 LAB
OHS QRS DURATION: 90 MS
OHS QTC CALCULATION: 392 MS

## 2025-08-12 PROCEDURE — 3078F DIAST BP <80 MM HG: CPT | Mod: CPTII,S$GLB,, | Performed by: PHYSICIAN ASSISTANT

## 2025-08-12 PROCEDURE — 1101F PT FALLS ASSESS-DOCD LE1/YR: CPT | Mod: CPTII,S$GLB,, | Performed by: PHYSICIAN ASSISTANT

## 2025-08-12 PROCEDURE — 3288F FALL RISK ASSESSMENT DOCD: CPT | Mod: CPTII,S$GLB,, | Performed by: PHYSICIAN ASSISTANT

## 2025-08-12 PROCEDURE — 99214 OFFICE O/P EST MOD 30 MIN: CPT | Mod: S$GLB,,, | Performed by: PHYSICIAN ASSISTANT

## 2025-08-12 PROCEDURE — 93010 ELECTROCARDIOGRAM REPORT: CPT | Mod: ,,, | Performed by: INTERNAL MEDICINE

## 2025-08-12 PROCEDURE — 99999 PR PBB SHADOW E&M-EST. PATIENT-LVL III: CPT | Mod: PBBFAC,,, | Performed by: PHYSICIAN ASSISTANT

## 2025-08-12 PROCEDURE — 1159F MED LIST DOCD IN RCRD: CPT | Mod: CPTII,S$GLB,, | Performed by: PHYSICIAN ASSISTANT

## 2025-08-12 PROCEDURE — 1126F AMNT PAIN NOTED NONE PRSNT: CPT | Mod: CPTII,S$GLB,, | Performed by: PHYSICIAN ASSISTANT

## 2025-08-12 PROCEDURE — 3074F SYST BP LT 130 MM HG: CPT | Mod: CPTII,S$GLB,, | Performed by: PHYSICIAN ASSISTANT

## 2025-08-12 PROCEDURE — 93005 ELECTROCARDIOGRAM TRACING: CPT | Mod: PO

## 2025-08-12 PROCEDURE — 1160F RVW MEDS BY RX/DR IN RCRD: CPT | Mod: CPTII,S$GLB,, | Performed by: PHYSICIAN ASSISTANT
